# Patient Record
Sex: FEMALE | Race: WHITE | Employment: OTHER | URBAN - NONMETROPOLITAN AREA
[De-identification: names, ages, dates, MRNs, and addresses within clinical notes are randomized per-mention and may not be internally consistent; named-entity substitution may affect disease eponyms.]

---

## 2019-04-24 LAB
CHOLESTEROL, TOTAL: 157 MG/DL
CHOLESTEROL/HDL RATIO: 2.9
HDLC SERPL-MCNC: 54 MG/DL (ref 35–70)
LDL CHOLESTEROL CALCULATED: 82 MG/DL (ref 0–160)
TRIGL SERPL-MCNC: 114 MG/DL
VLDLC SERPL CALC-MCNC: 103 MG/DL

## 2019-06-17 ENCOUNTER — OFFICE VISIT (OUTPATIENT)
Dept: PODIATRY | Age: 69
End: 2019-06-17
Payer: MEDICARE

## 2019-06-17 ENCOUNTER — OFFICE VISIT (OUTPATIENT)
Dept: PRIMARY CARE CLINIC | Age: 69
End: 2019-06-17
Payer: MEDICARE

## 2019-06-17 VITALS — HEIGHT: 67 IN | BODY MASS INDEX: 26.37 KG/M2 | RESPIRATION RATE: 18 BRPM | WEIGHT: 168 LBS | OXYGEN SATURATION: 98 %

## 2019-06-17 VITALS
TEMPERATURE: 98.3 F | HEART RATE: 88 BPM | SYSTOLIC BLOOD PRESSURE: 128 MMHG | WEIGHT: 169 LBS | RESPIRATION RATE: 18 BRPM | OXYGEN SATURATION: 98 % | HEIGHT: 67 IN | BODY MASS INDEX: 26.53 KG/M2 | DIASTOLIC BLOOD PRESSURE: 84 MMHG

## 2019-06-17 DIAGNOSIS — M79.671 PAIN IN RIGHT FOOT: Primary | ICD-10-CM

## 2019-06-17 DIAGNOSIS — Z01.818 PREOPERATIVE CLEARANCE: ICD-10-CM

## 2019-06-17 DIAGNOSIS — M17.10 KNEE ARTHROPATHY: ICD-10-CM

## 2019-06-17 DIAGNOSIS — R07.89 OTHER CHEST PAIN: Primary | ICD-10-CM

## 2019-06-17 DIAGNOSIS — M20.41 HAMMER TOE OF SECOND TOE OF RIGHT FOOT: ICD-10-CM

## 2019-06-17 DIAGNOSIS — G45.9 TIA (TRANSIENT ISCHEMIC ATTACK): ICD-10-CM

## 2019-06-17 DIAGNOSIS — M21.611 BUNION, RIGHT: ICD-10-CM

## 2019-06-17 DIAGNOSIS — M77.41 METATARSALGIA, RIGHT FOOT: ICD-10-CM

## 2019-06-17 DIAGNOSIS — I10 ESSENTIAL HYPERTENSION: ICD-10-CM

## 2019-06-17 PROCEDURE — 93000 ELECTROCARDIOGRAM COMPLETE: CPT | Performed by: INTERNAL MEDICINE

## 2019-06-17 PROCEDURE — 99213 OFFICE O/P EST LOW 20 MIN: CPT | Performed by: PODIATRIST

## 2019-06-17 PROCEDURE — 99215 OFFICE O/P EST HI 40 MIN: CPT | Performed by: INTERNAL MEDICINE

## 2019-06-17 RX ORDER — ATORVASTATIN CALCIUM 10 MG/1
10 TABLET, FILM COATED ORAL DAILY
COMMUNITY
Start: 2018-08-22 | End: 2020-01-28 | Stop reason: CLARIF

## 2019-06-17 RX ORDER — AMLODIPINE BESYLATE 10 MG/1
10 TABLET ORAL DAILY
COMMUNITY
Start: 2018-08-22 | End: 2019-06-17

## 2019-06-17 RX ORDER — UBIDECARENONE 200 MG
100 CAPSULE ORAL DAILY
COMMUNITY

## 2019-06-17 RX ORDER — POTASSIUM CHLORIDE 750 MG/1
TABLET, EXTENDED RELEASE ORAL
COMMUNITY
Start: 2019-04-08 | End: 2019-09-10

## 2019-06-17 RX ORDER — AMLODIPINE BESYLATE 10 MG/1
10 TABLET ORAL DAILY
COMMUNITY
End: 2020-01-28 | Stop reason: ALTCHOICE

## 2019-06-17 RX ORDER — DULOXETIN HYDROCHLORIDE 60 MG/1
CAPSULE, DELAYED RELEASE ORAL
COMMUNITY
End: 2019-10-24 | Stop reason: SDUPTHER

## 2019-06-17 ASSESSMENT — ENCOUNTER SYMPTOMS
PHOTOPHOBIA: 0
EYE DISCHARGE: 0
TROUBLE SWALLOWING: 0
BLOOD IN STOOL: 0
RHINORRHEA: 0
SORE THROAT: 0
WHEEZING: 0
FACIAL SWELLING: 0
NAUSEA: 0
ANAL BLEEDING: 0
ABDOMINAL PAIN: 0
EYE ITCHING: 0
SHORTNESS OF BREATH: 0
EYE PAIN: 0
COUGH: 0
VOMITING: 0
CONSTIPATION: 0
COLOR CHANGE: 0
STRIDOR: 0
DIARRHEA: 0

## 2019-06-17 ASSESSMENT — PATIENT HEALTH QUESTIONNAIRE - PHQ9
SUM OF ALL RESPONSES TO PHQ QUESTIONS 1-9: 0
1. LITTLE INTEREST OR PLEASURE IN DOING THINGS: 0
SUM OF ALL RESPONSES TO PHQ9 QUESTIONS 1 & 2: 0
SUM OF ALL RESPONSES TO PHQ QUESTIONS 1-9: 0
2. FEELING DOWN, DEPRESSED OR HOPELESS: 0

## 2019-06-17 NOTE — PROGRESS NOTES
Pt here today to follow up for right foot pain. Pt was pre certified for orthotics unfortunately they are not covered. 19  Adeola Devi : 1950 Sex: female  Age: 76 y.o. Patient was referred by Shakila Moreno DO    CC:    Follow-up right bunion and right hammertoe second digit    HPI:   Follow-up right bunion right hammertoe second digit. Scheduled to have total knee replacement right knee in 2 weeks. States she has been wearing offloading padding between great toe and second toe on the right has noted significant improvement. States pain has improved as she has been wearing the padding most every day. Denies new injury or trauma. Denies any calf pain. No additional pedal complaints at this time. ROS:  Const: Denies constitutional symptoms  Musculo: Denies symptoms other than stated above  Skin: Denies symptoms other than stated above       Current Outpatient Medications:     atorvastatin (LIPITOR) 10 MG tablet, Take 10 mg by mouth daily , Disp: , Rfl:     DULoxetine (CYMBALTA) 60 MG extended release capsule, Take by mouth, Disp: , Rfl:     potassium chloride (KLOR-CON M) 10 MEQ extended release tablet, , Disp: , Rfl:     Multiple Vitamins-Minerals (PRESERVISION AREDS 2+MULTI VIT PO), Take by mouth, Disp: , Rfl:     Coenzyme Q10 200 MG CAPS, Take 100 mg by mouth daily , Disp: , Rfl:     Mirabegron ER (MYRBETRIQ) 50 MG TB24, Take by mouth, Disp: , Rfl:     COATED ASPIRIN PO, Take 1 tablet by mouth daily , Disp: , Rfl:     topiramate (TOPAMAX) 50 MG tablet, Take 50 mg by mouth nightly.  , Disp: , Rfl:     hydrochlorothiazide (HYDRODIURIL) 25 MG tablet, Take 1 tablet by mouth daily , Disp: , Rfl:     vitamin D (ERGOCALCIFEROL) 45078 UNITS CAPS capsule, Take 50,000 Units by mouth once a week.  EVERY TWO (2) WEEKS , Disp: , Rfl:     amLODIPine (NORVASC) 10 MG tablet, Take 10 mg by mouth daily, Disp: , Rfl:   No Known Allergies    Past Medical History:   Diagnosis Date    Chronic back pain     Depression     Headache(784.0)     Hyperlipidemia     Hypertension     Osteoarthritis     TIA (transient ischemic attack)            Vitals:    06/17/19 1001   Resp: 18   SpO2: 98%   Weight: 168 lb (76.2 kg)   Height: 5' 7\" (1.702 m)       Work History/Social History: Dr. Nina Salinas right [facility] July 2019    Focused Lower Extremity Physical Exam:    Neurovascular examination:    Dorsalis Pedis palpable bilateral.  Posterior tibialis palpable bilateral.    Capillary Refill Time:  Immediate return  Hair growth:  Symmetrical and bilateral   Skin:  Not atrophic  Edema: No edema bilateral feet or ankles. Neurologic:  Light touch intact bilateral.      Musculoskeletal/ Orthopedic examination:    Equinis: Absent bilateral  Dorsiflexion, plantarflexion, inversion, eversion bilateral 5 out of 5 muscle strength  Wiggling toes  Negative Homans  Mild bunion deformity right great toe. Crossover hammertoe digit second digit. Positive Lachman second metatarsophalangeal joint. Mild tenderness medial eminence right great toe. Dermatology examination:    No open skin lesions or abrasions bilateral lower extremity. Assessment and Plan:  Steffanie Bojorquez was seen today for foot pain. Diagnoses and all orders for this visit:    Pain in right foot    Bunion, right    Metatarsalgia, right foot    Hammer toe of second toe of right foot    Follow-up bunion and hammertoe second digit right great toe right foot. I did dispense new offloading padding between great toe and second toe. Patient progressing well. We did check for orthotics these are not covered at this time. Continue all supportive walking shoe. Patient is having total knee replacement by Dr. Nina Salinas in July 2018. I will follow-up in 3 months. Continue with conservative treatment from foot and ankle standpoint at this time. Return in about 3 months (around 9/17/2019).       Seen By:  Rosy Barnett DPM      Document was created using voice recognition software. Note was reviewed, however may contain grammatical errors.

## 2019-06-17 NOTE — ASSESSMENT & PLAN NOTE
ekg showed nonspecific T wave flattening, no acute changes.   Order stress test at [facility]

## 2019-06-17 NOTE — ASSESSMENT & PLAN NOTE
Blood pressures are stable. Continue medications and monitor blood pressures at home. Call office if systolics are over 628 over diastolics over 90.

## 2019-06-17 NOTE — PROGRESS NOTES
2019    Name: El Das : 1950 Sex: female  Age: 76 y.o. Subjective:  Chief Complaint   Patient presents with    Pre-op Exam     Knee replacement scheduled for 7/10/19. This 76y.o. -year-old female  presents today for evaluation and management of her  chronic medical problems. Current medication list reviewed. The patient is tolerating all medications well without adverse events or known side effects. The patient does understand the risk and benefits of the prescribed medications. Patient is here for preoperative clearance. She is scheduled for a right total knee arthroplasty with Dr. Hugo Lara on July 10, 2019. She has had problems with pain in her right knee. Her knee angel when she walks. She has had injections done by Dr. Null Later. She is tried physical therapy, anti-inflammatories and she is to the point where it is difficult for her to walk because of the pain. X-ray showed \"bone-on-bone\" arthritis of the right knee. She has a history of remote TIA on aspirin, hypertension on amlodipine, urge incontinence Myrbetriq per urologist, depression on duloxetine, hyperlipidemia on atorvastatin. She complains  of pressure under her left breast.  Radiates up to her jaw. Sometimes gets short of breath with this. It is lasts about 2 or 3 minutes. Sometimes she takes an antacid which helps. No diaphoresis, lightheadedness. No previous history of cardiac problems. Years ago she had a treadmill stress test which was negative for ischemia. Will have to order a nuclear stress testt she is  unable to walk on the treadmill because of her knee arthritis    Review of Systems   Constitutional: Negative for appetite change, fatigue and unexpected weight change. HENT: Negative for congestion, ear pain, facial swelling, rhinorrhea, sore throat, tinnitus and trouble swallowing. Eyes: Negative for photophobia, pain, discharge, itching and visual disturbance.    Respiratory: Negative for cough, shortness of breath, wheezing and stridor. Cardiovascular: Positive for chest pain. Negative for palpitations and leg swelling. See hpi   Gastrointestinal: Negative for abdominal pain, anal bleeding, blood in stool, constipation, diarrhea, nausea and vomiting. Endocrine: Negative for cold intolerance, heat intolerance, polydipsia, polyphagia and polyuria. Genitourinary: Negative for difficulty urinating, dysuria, flank pain, frequency, hematuria and urgency. Musculoskeletal: Positive for arthralgias. Negative for gait problem, joint swelling and myalgias. Right knee pain, see hpi   Skin: Negative for color change, pallor and rash. Allergic/Immunologic: Negative for environmental allergies and food allergies. Neurological: Negative for dizziness, tremors, seizures, syncope, speech difficulty, weakness, light-headedness, numbness and headaches. Hematological: Negative for adenopathy. Does not bruise/bleed easily. Psychiatric/Behavioral: Negative for agitation, behavioral problems, confusion, sleep disturbance and suicidal ideas. The patient is not nervous/anxious.            Current Outpatient Medications:     atorvastatin (LIPITOR) 10 MG tablet, Take 10 mg by mouth daily , Disp: , Rfl:     DULoxetine (CYMBALTA) 60 MG extended release capsule, Take by mouth, Disp: , Rfl:     potassium chloride (KLOR-CON M) 10 MEQ extended release tablet, , Disp: , Rfl:     Multiple Vitamins-Minerals (PRESERVISION AREDS 2+MULTI VIT PO), Take by mouth, Disp: , Rfl:     Coenzyme Q10 200 MG CAPS, Take 100 mg by mouth daily , Disp: , Rfl:     Mirabegron ER (MYRBETRIQ) 50 MG TB24, Take by mouth, Disp: , Rfl:     amLODIPine (NORVASC) 10 MG tablet, Take 10 mg by mouth daily, Disp: , Rfl:     COATED ASPIRIN PO, Take 1 tablet by mouth daily , Disp: , Rfl:     topiramate (TOPAMAX) 50 MG tablet, Take 50 mg by mouth nightly.  , Disp: , Rfl:     hydrochlorothiazide (HYDRODIURIL) 25 MG tablet, Take 1 tablet by mouth daily , Disp: , Rfl:     vitamin D (ERGOCALCIFEROL) 00643 UNITS CAPS capsule, Take 50,000 Units by mouth once a week. EVERY TWO (2) WEEKS , Disp: , Rfl:      No Known Allergies     Past Medical History:   Diagnosis Date    Chronic back pain     Depression     Headache(784.0)     Hyperlipidemia     Hypertension     Osteoarthritis     TIA (transient ischemic attack)        Health Maintenance Due   Topic Date Due    Potassium monitoring  1950    Creatinine monitoring  1950    Hepatitis C screen  1950    Lipid screen  06/28/1990    Diabetes screen  06/28/1990    Breast cancer screen  06/28/2000    Colon cancer screen colonoscopy  06/28/2000    DEXA (modify frequency per FRAX score)  06/28/2015    Shingles Vaccine (2 of 2) 07/05/2018    Pneumococcal 65+ years Vaccine (2 of 2 - PPSV23) 06/13/2019        Patient Active Problem List   Diagnosis    Cervical spondylosis without myelopathy    TIA (transient ischemic attack)    Essential hypertension    Preoperative clearance    Knee arthropathy    Other chest pain        Past Surgical History:   Procedure Laterality Date    HYSTERECTOMY      JOINT REPLACEMENT  2007    Knee surgery    SPINE SURGERY  2001    neck surgery with Dr Jean Campos        Family History   Problem Relation Age of Onset    Diabetes Mother         Social History     Tobacco Use    Smoking status: Never Smoker    Smokeless tobacco: Never Used   Substance Use Topics    Alcohol use: No    Drug use: No        Objective  Vitals:    06/17/19 1043   BP: 128/84   Site: Left Upper Arm   Position: Sitting   Cuff Size: Medium Adult   Pulse: 88   Resp: 18   Temp: 98.3 °F (36.8 °C)   TempSrc: Temporal   SpO2: 98%   Weight: 169 lb (76.7 kg)   Height: 5' 7\" (1.702 m)        Exam:  Physical Exam   Constitutional: She is oriented to person, place, and time. She appears well-developed and well-nourished. HENT:   Head: Normocephalic.    Right Ear: External ear normal.   Left Ear: External ear normal.   Nose: Nose normal.   Mouth/Throat: Oropharynx is clear and moist. No oropharyngeal exudate. Eyes: Pupils are equal, round, and reactive to light. Conjunctivae and EOM are normal. Right eye exhibits no discharge. Left eye exhibits no discharge. No scleral icterus. Neck: Normal range of motion. Neck supple. No thyromegaly present. Cardiovascular: Normal rate, regular rhythm, normal heart sounds and intact distal pulses. Exam reveals no gallop and no friction rub. No murmur heard. Pulmonary/Chest: Effort normal and breath sounds normal. No respiratory distress. She has no wheezes. She has no rales. She exhibits no tenderness. Abdominal: Soft. Bowel sounds are normal. She exhibits no distension and no mass. There is no tenderness. There is no rebound and no guarding. Musculoskeletal: She exhibits no edema, tenderness or deformity. Tenderness elicited on palpation of her right knee. Decreased range of motion to flexion and extension of her right knee   Lymphadenopathy:     She has no cervical adenopathy. Neurological: She is alert and oriented to person, place, and time. She displays normal reflexes. No cranial nerve deficit or sensory deficit. Skin: Skin is warm and dry. No rash noted. No erythema. No pallor. Psychiatric: She has a normal mood and affect. Her behavior is normal. Judgment and thought content normal.        Last labs reviewed. ASSESSMENT & PLAN :   Problem List        Circulatory    TIA (transient ischemic attack)     Continue aspirin but discontinue about 10 days prior to surgery         Relevant Medications    COATED ASPIRIN PO    hydrochlorothiazide (HYDRODIURIL) 25 MG tablet    atorvastatin (LIPITOR) 10 MG tablet    amLODIPine (NORVASC) 10 MG tablet    Essential hypertension     Blood pressures are stable. Continue medications and monitor blood pressures at home.  Call office if systolics are over 520 over diastolics

## 2019-06-17 NOTE — PATIENT INSTRUCTIONS
Nuclear medicine stress test at [address] [place] [address]  See me one week after stress test

## 2019-07-02 DIAGNOSIS — R07.89 OTHER CHEST PAIN: ICD-10-CM

## 2019-07-17 PROBLEM — Z01.818 PREOPERATIVE CLEARANCE: Status: RESOLVED | Noted: 2019-06-17 | Resolved: 2019-07-17

## 2019-07-24 ENCOUNTER — OFFICE VISIT (OUTPATIENT)
Dept: PRIMARY CARE CLINIC | Age: 69
End: 2019-07-24
Payer: MEDICARE

## 2019-07-24 VITALS
WEIGHT: 172.2 LBS | HEART RATE: 108 BPM | SYSTOLIC BLOOD PRESSURE: 118 MMHG | RESPIRATION RATE: 18 BRPM | BODY MASS INDEX: 27.03 KG/M2 | OXYGEN SATURATION: 96 % | TEMPERATURE: 98.1 F | DIASTOLIC BLOOD PRESSURE: 74 MMHG | HEIGHT: 67 IN

## 2019-07-24 DIAGNOSIS — K59.03 DRUG-INDUCED CONSTIPATION: ICD-10-CM

## 2019-07-24 DIAGNOSIS — I10 ESSENTIAL HYPERTENSION: Primary | ICD-10-CM

## 2019-07-24 DIAGNOSIS — F32.A DEPRESSION, UNSPECIFIED DEPRESSION TYPE: ICD-10-CM

## 2019-07-24 DIAGNOSIS — E78.2 MIXED HYPERLIPIDEMIA: ICD-10-CM

## 2019-07-24 PROBLEM — E78.5 HYPERLIPIDEMIA: Status: ACTIVE | Noted: 2019-07-24

## 2019-07-24 PROCEDURE — 99213 OFFICE O/P EST LOW 20 MIN: CPT | Performed by: INTERNAL MEDICINE

## 2019-07-24 RX ORDER — OXYBUTYNIN CHLORIDE 15 MG/1
1 TABLET, EXTENDED RELEASE ORAL DAILY PRN
Refills: 0 | COMMUNITY
Start: 2019-07-02 | End: 2019-09-10

## 2019-07-24 RX ORDER — OXYBUTYNIN CHLORIDE 15 MG/1
15 TABLET, EXTENDED RELEASE ORAL DAILY
COMMUNITY
End: 2019-09-10 | Stop reason: ALTCHOICE

## 2019-07-24 RX ORDER — DOCUSATE SODIUM 100 MG/1
1 CAPSULE, LIQUID FILLED ORAL DAILY PRN
Refills: 0 | COMMUNITY
Start: 2019-07-12

## 2019-07-24 ASSESSMENT — ENCOUNTER SYMPTOMS
ABDOMINAL PAIN: 0
CONSTIPATION: 1
PHOTOPHOBIA: 0
ANAL BLEEDING: 0
WHEEZING: 0
STRIDOR: 0
VOMITING: 0
FACIAL SWELLING: 0
EYE ITCHING: 0
BLOOD IN STOOL: 0
SHORTNESS OF BREATH: 0
SORE THROAT: 0
EYE DISCHARGE: 0
COUGH: 0
RHINORRHEA: 0
EYE PAIN: 0
COLOR CHANGE: 0
TROUBLE SWALLOWING: 0
NAUSEA: 0
DIARRHEA: 0

## 2019-07-24 NOTE — PROGRESS NOTES
tablet    atorvastatin (LIPITOR) 10 MG tablet    amLODIPine (NORVASC) 10 MG tablet           Return in about 3 months (around 10/24/2019).        Willem Robles,   7/24/2019

## 2019-09-05 ENCOUNTER — OFFICE VISIT (OUTPATIENT)
Dept: PRIMARY CARE CLINIC | Age: 69
End: 2019-09-05
Payer: MEDICARE

## 2019-09-05 ENCOUNTER — HOSPITAL ENCOUNTER (OUTPATIENT)
Age: 69
Discharge: HOME | End: 2019-09-07
Payer: MEDICARE

## 2019-09-05 VITALS
RESPIRATION RATE: 16 BRPM | BODY MASS INDEX: 24.96 KG/M2 | HEART RATE: 92 BPM | DIASTOLIC BLOOD PRESSURE: 80 MMHG | TEMPERATURE: 98.4 F | SYSTOLIC BLOOD PRESSURE: 120 MMHG | OXYGEN SATURATION: 95 % | WEIGHT: 159 LBS | HEIGHT: 67 IN

## 2019-09-05 DIAGNOSIS — I10 ESSENTIAL HYPERTENSION: ICD-10-CM

## 2019-09-05 DIAGNOSIS — E87.6 HYPOKALEMIA: ICD-10-CM

## 2019-09-05 DIAGNOSIS — R63.4 WEIGHT LOSS: ICD-10-CM

## 2019-09-05 DIAGNOSIS — R35.0 FREQUENCY OF URINATION: ICD-10-CM

## 2019-09-05 DIAGNOSIS — F32.A DEPRESSION, UNSPECIFIED DEPRESSION TYPE: ICD-10-CM

## 2019-09-05 DIAGNOSIS — R63.4 WEIGHT LOSS: Primary | ICD-10-CM

## 2019-09-05 LAB
ALBUMIN SERPL-MCNC: 4.7 G/DL (ref 3.5–5.2)
ALP BLD-CCNC: 96 U/L (ref 35–104)
ALT SERPL-CCNC: 23 U/L (ref 0–32)
ANION GAP SERPL CALCULATED.3IONS-SCNC: 19 MMOL/L (ref 7–16)
AST SERPL-CCNC: 29 U/L (ref 0–31)
BASOPHILS ABSOLUTE: 0.1 E9/L (ref 0–0.2)
BASOPHILS RELATIVE PERCENT: 1 % (ref 0–2)
BILIRUB SERPL-MCNC: 0.5 MG/DL (ref 0–1.2)
BILIRUBIN, POC: NEGATIVE
BLOOD URINE, POC: NEGATIVE
BUN BLDV-MCNC: 18 MG/DL (ref 8–23)
CALCIUM SERPL-MCNC: 10.4 MG/DL (ref 8.6–10.2)
CHLORIDE BLD-SCNC: 102 MMOL/L (ref 98–107)
CHP ED QC CHECK: NORMAL
CLARITY, POC: CLEAR
CO2: 25 MMOL/L (ref 22–29)
COLOR, POC: YELLOW
CREAT SERPL-MCNC: 0.6 MG/DL (ref 0.5–1)
EOSINOPHILS ABSOLUTE: 0.53 E9/L (ref 0.05–0.5)
EOSINOPHILS RELATIVE PERCENT: 5.4 % (ref 0–6)
GFR AFRICAN AMERICAN: >60
GFR NON-AFRICAN AMERICAN: >60 ML/MIN/1.73
GLUCOSE BLD-MCNC: 103 MG/DL (ref 74–99)
GLUCOSE BLD-MCNC: 111 MG/DL
GLUCOSE URINE, POC: NEGATIVE
HCT VFR BLD CALC: 46.1 % (ref 34–48)
HEMOGLOBIN: 14.6 G/DL (ref 11.5–15.5)
IMMATURE GRANULOCYTES #: 0.02 E9/L
IMMATURE GRANULOCYTES %: 0.2 % (ref 0–5)
KETONES, POC: NEGATIVE
LEUKOCYTE EST, POC: NORMAL
LYMPHOCYTES ABSOLUTE: 2.74 E9/L (ref 1.5–4)
LYMPHOCYTES RELATIVE PERCENT: 28.2 % (ref 20–42)
MCH RBC QN AUTO: 29.6 PG (ref 26–35)
MCHC RBC AUTO-ENTMCNC: 31.7 % (ref 32–34.5)
MCV RBC AUTO: 93.3 FL (ref 80–99.9)
MONOCYTES ABSOLUTE: 1.01 E9/L (ref 0.1–0.95)
MONOCYTES RELATIVE PERCENT: 10.4 % (ref 2–12)
NEUTROPHILS ABSOLUTE: 5.33 E9/L (ref 1.8–7.3)
NEUTROPHILS RELATIVE PERCENT: 54.8 % (ref 43–80)
NITRITE, POC: NEGATIVE
PDW BLD-RTO: 13.5 FL (ref 11.5–15)
PH, POC: 8.5
PLATELET # BLD: 435 E9/L (ref 130–450)
PMV BLD AUTO: 10.7 FL (ref 7–12)
POTASSIUM SERPL-SCNC: 3 MMOL/L (ref 3.5–5)
PROTEIN, POC: NORMAL
RBC # BLD: 4.94 E12/L (ref 3.5–5.5)
SEDIMENTATION RATE, ERYTHROCYTE: 12 MM/HR (ref 0–20)
SODIUM BLD-SCNC: 146 MMOL/L (ref 132–146)
SPECIFIC GRAVITY, POC: 1.01
T4 FREE: 1.1 NG/DL (ref 0.93–1.7)
TOTAL PROTEIN: 7.7 G/DL (ref 6.4–8.3)
TSH SERPL DL<=0.05 MIU/L-ACNC: 1.81 UIU/ML (ref 0.27–4.2)
UROBILINOGEN, POC: 0.2
WBC # BLD: 9.7 E9/L (ref 4.5–11.5)

## 2019-09-05 PROCEDURE — 82962 GLUCOSE BLOOD TEST: CPT | Performed by: INTERNAL MEDICINE

## 2019-09-05 PROCEDURE — 85025 COMPLETE CBC W/AUTO DIFF WBC: CPT

## 2019-09-05 PROCEDURE — 36415 COLL VENOUS BLD VENIPUNCTURE: CPT

## 2019-09-05 PROCEDURE — 85651 RBC SED RATE NONAUTOMATED: CPT

## 2019-09-05 PROCEDURE — 84439 ASSAY OF FREE THYROXINE: CPT

## 2019-09-05 PROCEDURE — 80053 COMPREHEN METABOLIC PANEL: CPT

## 2019-09-05 PROCEDURE — 99214 OFFICE O/P EST MOD 30 MIN: CPT | Performed by: INTERNAL MEDICINE

## 2019-09-05 PROCEDURE — 84443 ASSAY THYROID STIM HORMONE: CPT

## 2019-09-05 PROCEDURE — 81002 URINALYSIS NONAUTO W/O SCOPE: CPT | Performed by: INTERNAL MEDICINE

## 2019-09-05 ASSESSMENT — ENCOUNTER SYMPTOMS
EYE ITCHING: 0
BLOOD IN STOOL: 0
FACIAL SWELLING: 0
NAUSEA: 0
TROUBLE SWALLOWING: 0
COLOR CHANGE: 0
PHOTOPHOBIA: 0
ANAL BLEEDING: 0
SHORTNESS OF BREATH: 0
VOMITING: 0
WHEEZING: 0
STRIDOR: 0
COUGH: 0
RHINORRHEA: 0
SORE THROAT: 0
CONSTIPATION: 0
EYE DISCHARGE: 0
ABDOMINAL PAIN: 0
EYE PAIN: 0
DIARRHEA: 0

## 2019-09-05 NOTE — ASSESSMENT & PLAN NOTE
Check a CBC, CMP, sed rate, TSH and free T4. Paxton Dawkins Fasting blood sugar by fingerstick was 111.

## 2019-09-05 NOTE — PROGRESS NOTES
visual disturbance. Respiratory: Negative for cough, shortness of breath, wheezing and stridor. Cardiovascular: Negative for chest pain, palpitations and leg swelling. Gastrointestinal: Negative for abdominal pain, anal bleeding, blood in stool, constipation, diarrhea, nausea and vomiting. Endocrine: Negative for cold intolerance, heat intolerance, polydipsia, polyphagia and polyuria. Genitourinary: Positive for frequency. Negative for difficulty urinating, dysuria, flank pain, hematuria and urgency. Musculoskeletal: Negative for arthralgias, gait problem, joint swelling and myalgias. Skin: Negative for color change, pallor and rash. Allergic/Immunologic: Negative for environmental allergies and food allergies. Neurological: Negative for dizziness, tremors, seizures, syncope, speech difficulty, weakness, light-headedness, numbness and headaches. Hematological: Negative for adenopathy. Does not bruise/bleed easily. Psychiatric/Behavioral: Negative for agitation, behavioral problems, confusion, sleep disturbance and suicidal ideas. The patient is not nervous/anxious.            Current Outpatient Medications:     RA COL-RITE 100 MG capsule, Take 1 capsule by mouth daily as needed, Disp: , Rfl: 0    MELOXICAM PO, Take 1 tablet by mouth daily as needed, Disp: , Rfl:     oxybutynin (DITROPAN XL) 15 MG extended release tablet, Take 15 mg by mouth daily, Disp: , Rfl:     atorvastatin (LIPITOR) 10 MG tablet, Take 10 mg by mouth daily , Disp: , Rfl:     DULoxetine (CYMBALTA) 60 MG extended release capsule, Take by mouth, Disp: , Rfl:     potassium chloride (KLOR-CON M) 10 MEQ extended release tablet, , Disp: , Rfl:     Multiple Vitamins-Minerals (PRESERVISION AREDS 2+MULTI VIT PO), Take by mouth, Disp: , Rfl:     Coenzyme Q10 200 MG CAPS, Take 100 mg by mouth daily , Disp: , Rfl:     amLODIPine (NORVASC) 10 MG tablet, Take 10 mg by mouth daily, Disp: , Rfl:     COATED ASPIRIN PO, Take 1 tablet by mouth 2 times daily , Disp: , Rfl:     topiramate (TOPAMAX) 50 MG tablet, Take 50 mg by mouth nightly.  , Disp: , Rfl:     hydrochlorothiazide (HYDRODIURIL) 25 MG tablet, Take 1 tablet by mouth daily , Disp: , Rfl:     vitamin D (ERGOCALCIFEROL) 54437 UNITS CAPS capsule, Take 50,000 Units by mouth once a week.  EVERY TWO (2) WEEKS , Disp: , Rfl:     oxybutynin (DITROPAN XL) 15 MG extended release tablet, Take 1 tablet by mouth daily as needed, Disp: , Rfl: 0     No Known Allergies     Past Medical History:   Diagnosis Date    Chronic back pain     Depression     Headache(784.0)     Hyperlipidemia     Hypertension     Osteoarthritis     TIA (transient ischemic attack)        Health Maintenance Due   Topic Date Due    Hepatitis C screen  1950    Lipid screen  06/28/1990    Annual Wellness Visit (AWV)  06/28/2013    DEXA (modify frequency per FRAX score)  06/28/2015    Pneumococcal 65+ years Vaccine (2 of 2 - PPSV23) 06/13/2019    Flu vaccine (1) 09/01/2019        Patient Active Problem List   Diagnosis    Cervical spondylosis without myelopathy    TIA (transient ischemic attack)    Essential hypertension    Knee arthropathy    Other chest pain    Depression    Hyperlipidemia    Drug-induced constipation    Weight loss    Frequency of urination    Hypokalemia        Past Surgical History:   Procedure Laterality Date    HYSTERECTOMY      JOINT REPLACEMENT  2007    Knee surgery    SPINE SURGERY  2001    neck surgery with Dr Bernie Alvarez        Family History   Problem Relation Age of Onset    Diabetes Mother         Social History     Tobacco Use    Smoking status: Never Smoker    Smokeless tobacco: Never Used   Substance Use Topics    Alcohol use: No    Drug use: No        Objective  Vitals:    09/05/19 0818   BP: 120/80   Site: Right Upper Arm   Position: Sitting   Cuff Size: Medium Adult   Pulse: 92   Resp: 16   Temp: 98.4 °F (36.9 °C)   TempSrc: Temporal   SpO2: 95%   Weight: 159 lb (72.1 kg)   Height: 5' 7\" (1.702 m)        Exam:  Physical Exam   Constitutional: She is oriented to person, place, and time. She appears well-developed and well-nourished. HENT:   Head: Normocephalic. Right Ear: External ear normal.   Left Ear: External ear normal.   Nose: Nose normal.   Mouth/Throat: Oropharynx is clear and moist. No oropharyngeal exudate. Eyes: Pupils are equal, round, and reactive to light. Conjunctivae and EOM are normal. Right eye exhibits no discharge. Left eye exhibits no discharge. No scleral icterus. Neck: Normal range of motion. Neck supple. No thyromegaly present. Cardiovascular: Normal rate, regular rhythm, normal heart sounds and intact distal pulses. Exam reveals no gallop and no friction rub. No murmur heard. Pulmonary/Chest: Effort normal and breath sounds normal. No respiratory distress. She has no wheezes. She has no rales. She exhibits no tenderness. Abdominal: Soft. Bowel sounds are normal. She exhibits no distension and no mass. There is no tenderness. There is no rebound and no guarding. Musculoskeletal: Normal range of motion. She exhibits no edema, tenderness or deformity. Incision well-healed on her right patella. There is no erythema or increased warmth around the incision site. Lymphadenopathy:     She has no cervical adenopathy. Neurological: She is alert and oriented to person, place, and time. She displays normal reflexes. No cranial nerve deficit or sensory deficit. Skin: Skin is warm and dry. No rash noted. No erythema. No pallor. Psychiatric: She has a normal mood and affect. Her behavior is normal. Judgment and thought content normal.        Last labs reviewed. ASSESSMENT & PLAN :   Problem List        Circulatory    Essential hypertension     Blood pressures are stable. Continue medications and monitor blood pressures at home. Call office if systolics are over 291 over diastolics over 90.          Relevant Medications    COATED ASPIRIN PO    hydrochlorothiazide (HYDRODIURIL) 25 MG tablet    atorvastatin (LIPITOR) 10 MG tablet    amLODIPine (NORVASC) 10 MG tablet       Other    Depression     Continue duloxetine         Relevant Medications    DULoxetine (CYMBALTA) 60 MG extended release capsule    Weight loss - Primary     Check a CBC, CMP, sed rate, TSH and free T4. Roberto Carlos Cote Fasting blood sugar by fingerstick was 111. Relevant Orders    Comprehensive Metabolic Panel    CBC Auto Differential    Sedimentation Rate    POCT Glucose (Completed)    T4, Free    TSH without Reflex    Frequency of urination     Urinalysis did not show any evidence of glucosuria or ketonuria. No sign of infection. Relevant Orders    POCT Urinalysis no Micro (Completed)    POCT Glucose (Completed)    Hypokalemia     Check serum potassium         Relevant Orders    Comprehensive Metabolic Panel           Return in about 5 days (around 9/10/2019).   If she gets worse in the meantime she is to go to the emergency department      Shira López,   9/5/2019

## 2019-09-05 NOTE — ASSESSMENT & PLAN NOTE
Blood pressures are stable. Continue medications and monitor blood pressures at home. Call office if systolics are over 098 over diastolics over 90.

## 2019-09-10 ENCOUNTER — OFFICE VISIT (OUTPATIENT)
Dept: PRIMARY CARE CLINIC | Age: 69
End: 2019-09-10
Payer: MEDICARE

## 2019-09-10 ENCOUNTER — HOSPITAL ENCOUNTER (OUTPATIENT)
Age: 69
Discharge: HOME | End: 2019-09-12
Payer: MEDICARE

## 2019-09-10 VITALS
OXYGEN SATURATION: 97 % | RESPIRATION RATE: 16 BRPM | TEMPERATURE: 98.5 F | HEART RATE: 85 BPM | DIASTOLIC BLOOD PRESSURE: 72 MMHG | WEIGHT: 161.4 LBS | SYSTOLIC BLOOD PRESSURE: 108 MMHG | BODY MASS INDEX: 25.28 KG/M2

## 2019-09-10 DIAGNOSIS — E87.6 HYPOKALEMIA: Primary | ICD-10-CM

## 2019-09-10 DIAGNOSIS — I10 ESSENTIAL HYPERTENSION: ICD-10-CM

## 2019-09-10 DIAGNOSIS — E78.2 MIXED HYPERLIPIDEMIA: ICD-10-CM

## 2019-09-10 DIAGNOSIS — Z23 NEED FOR INFLUENZA VACCINATION: ICD-10-CM

## 2019-09-10 DIAGNOSIS — R35.0 FREQUENCY OF URINATION: ICD-10-CM

## 2019-09-10 DIAGNOSIS — Z23 NEED FOR TETANUS BOOSTER: ICD-10-CM

## 2019-09-10 DIAGNOSIS — M17.10 KNEE ARTHROPATHY: ICD-10-CM

## 2019-09-10 DIAGNOSIS — F32.A DEPRESSION, UNSPECIFIED DEPRESSION TYPE: ICD-10-CM

## 2019-09-10 LAB
CHOLESTEROL, TOTAL: 161 MG/DL (ref 0–199)
HDLC SERPL-MCNC: 49 MG/DL
LDL CHOLESTEROL CALCULATED: 87 MG/DL (ref 0–99)
TRIGL SERPL-MCNC: 125 MG/DL (ref 0–149)
VLDLC SERPL CALC-MCNC: 25 MG/DL

## 2019-09-10 PROCEDURE — 90653 IIV ADJUVANT VACCINE IM: CPT | Performed by: INTERNAL MEDICINE

## 2019-09-10 PROCEDURE — 36415 COLL VENOUS BLD VENIPUNCTURE: CPT

## 2019-09-10 PROCEDURE — 80061 LIPID PANEL: CPT

## 2019-09-10 PROCEDURE — 99214 OFFICE O/P EST MOD 30 MIN: CPT | Performed by: INTERNAL MEDICINE

## 2019-09-10 PROCEDURE — G0008 ADMIN INFLUENZA VIRUS VAC: HCPCS | Performed by: INTERNAL MEDICINE

## 2019-09-10 RX ORDER — POTASSIUM CHLORIDE 20 MEQ/1
20 TABLET, EXTENDED RELEASE ORAL DAILY
Qty: 30 TABLET | Refills: 5 | Status: SHIPPED | OUTPATIENT
Start: 2019-09-10 | End: 2020-01-07 | Stop reason: SDUPTHER

## 2019-09-10 ASSESSMENT — ENCOUNTER SYMPTOMS
COUGH: 0
TROUBLE SWALLOWING: 0
SORE THROAT: 0
BLOOD IN STOOL: 0
EYE PAIN: 0
DIARRHEA: 0
EYE ITCHING: 0
WHEEZING: 0
VOMITING: 0
NAUSEA: 0
RHINORRHEA: 0
ABDOMINAL PAIN: 0
FACIAL SWELLING: 0
EYE DISCHARGE: 0
CONSTIPATION: 0
COLOR CHANGE: 0
SHORTNESS OF BREATH: 0
PHOTOPHOBIA: 0
ANAL BLEEDING: 0
STRIDOR: 0

## 2019-09-10 NOTE — PROGRESS NOTES
9/10/2019    Name: Eliza Rivero : 1950 Sex: female  Age: 71 y.o. Subjective:  Chief Complaint   Patient presents with    Fever     5 day recheck of fever. Symptoms are improved. HPI     Patient was evaluated last week for generalized malaise, low temperature, see previous encounter. Blood work was unremarkable. Except for potassium which was low at 3. This included a CBC, CMP and a sed rate. That day she saw her urologist on a regular scheduled appointment and he did a UA on her. He felt that she had a UTI and treated her for this. She feels much better. She also talked with her urologist regarding placement of an InterStim. She is going to think about this. She has an appointment to see Dr. Tatianna Torres on Monday because of pain in her right foot. She has seen him in the past.    She has a history of hypertension, hyperlipidemia, knee arthritis, urinary frequency, and depression. She is on Cymbalta 60 mg a day and does well on this. She has no symptoms of anhedonia, hypersomnolence or suicidal or homicidal ideations. Review of Systems   Constitutional: Negative for appetite change, fatigue and unexpected weight change. HENT: Negative for congestion, ear pain, facial swelling, rhinorrhea, sore throat, tinnitus and trouble swallowing. Eyes: Negative for photophobia, pain, discharge, itching and visual disturbance. Respiratory: Negative for cough, shortness of breath, wheezing and stridor. Cardiovascular: Negative for chest pain, palpitations and leg swelling. Gastrointestinal: Negative for abdominal pain, anal bleeding, blood in stool, constipation, diarrhea, nausea and vomiting. Endocrine: Negative for cold intolerance, heat intolerance, polydipsia, polyphagia and polyuria. Genitourinary: Positive for frequency and urgency. Negative for difficulty urinating, dysuria, flank pain and hematuria. Musculoskeletal: Positive for arthralgias.  Negative for gait problem, joint swelling and myalgias. Right great toe   Skin: Negative for color change, pallor and rash. Allergic/Immunologic: Negative for environmental allergies and food allergies. Neurological: Negative for dizziness, tremors, seizures, syncope, speech difficulty, weakness, light-headedness, numbness and headaches. Hematological: Negative for adenopathy. Does not bruise/bleed easily. Psychiatric/Behavioral: Negative for agitation, behavioral problems, confusion, sleep disturbance and suicidal ideas. The patient is not nervous/anxious. Current Outpatient Medications:     potassium chloride (KLOR-CON M) 20 MEQ extended release tablet, Take 1 tablet by mouth daily, Disp: 30 tablet, Rfl: 5    Tetanus-Diphth-Acell Pertussis (BOOSTRIX) 5-2.5-18.5 LF-MCG/0.5 injection, Inject 0.5 mLs into the muscle once for 1 dose, Disp: 1 each, Rfl: 0    RA COL-RITE 100 MG capsule, Take 1 capsule by mouth daily as needed, Disp: , Rfl: 0    MELOXICAM PO, Take 1 tablet by mouth daily as needed, Disp: , Rfl:     atorvastatin (LIPITOR) 10 MG tablet, Take 10 mg by mouth daily , Disp: , Rfl:     DULoxetine (CYMBALTA) 60 MG extended release capsule, Take by mouth, Disp: , Rfl:     Multiple Vitamins-Minerals (PRESERVISION AREDS 2+MULTI VIT PO), Take by mouth, Disp: , Rfl:     Coenzyme Q10 200 MG CAPS, Take 100 mg by mouth daily , Disp: , Rfl:     amLODIPine (NORVASC) 10 MG tablet, Take 10 mg by mouth daily, Disp: , Rfl:     COATED ASPIRIN PO, Take 1 tablet by mouth 2 times daily , Disp: , Rfl:     topiramate (TOPAMAX) 50 MG tablet, Take 50 mg by mouth nightly.  , Disp: , Rfl:     hydrochlorothiazide (HYDRODIURIL) 25 MG tablet, Take 1 tablet by mouth daily , Disp: , Rfl:     vitamin D (ERGOCALCIFEROL) 42871 UNITS CAPS capsule, Take 50,000 Units by mouth once a week.  EVERY TWO (2) WEEKS , Disp: , Rfl:      No Known Allergies     Past Medical History:   Diagnosis Date    Chronic back pain     Depression    

## 2019-09-11 ENCOUNTER — TELEPHONE (OUTPATIENT)
Dept: FAMILY MEDICINE CLINIC | Age: 69
End: 2019-09-11

## 2019-09-16 ENCOUNTER — OFFICE VISIT (OUTPATIENT)
Dept: PODIATRY | Age: 69
End: 2019-09-16
Payer: MEDICARE

## 2019-09-16 VITALS
TEMPERATURE: 97 F | BODY MASS INDEX: 24.8 KG/M2 | HEART RATE: 84 BPM | WEIGHT: 158 LBS | HEIGHT: 67 IN | OXYGEN SATURATION: 99 %

## 2019-09-16 DIAGNOSIS — M77.42 METATARSALGIA, LEFT FOOT: ICD-10-CM

## 2019-09-16 DIAGNOSIS — M77.41 METATARSALGIA, RIGHT FOOT: ICD-10-CM

## 2019-09-16 DIAGNOSIS — M21.611 BUNION, RIGHT: ICD-10-CM

## 2019-09-16 DIAGNOSIS — S90.852A FOREIGN BODY IN LEFT FOOT, INITIAL ENCOUNTER: Primary | ICD-10-CM

## 2019-09-16 PROCEDURE — 3017F COLORECTAL CA SCREEN DOC REV: CPT | Performed by: PODIATRIST

## 2019-09-16 PROCEDURE — 3014F SCREEN MAMMO DOC REV: CPT | Performed by: PODIATRIST

## 2019-09-16 PROCEDURE — 1123F ACP DISCUSS/DSCN MKR DOCD: CPT | Performed by: PODIATRIST

## 2019-09-16 PROCEDURE — 1090F PRES/ABSN URINE INCON ASSESS: CPT | Performed by: PODIATRIST

## 2019-09-16 PROCEDURE — 99213 OFFICE O/P EST LOW 20 MIN: CPT | Performed by: PODIATRIST

## 2019-09-16 PROCEDURE — G8400 PT W/DXA NO RESULTS DOC: HCPCS | Performed by: PODIATRIST

## 2019-09-16 PROCEDURE — G8420 CALC BMI NORM PARAMETERS: HCPCS | Performed by: PODIATRIST

## 2019-09-16 PROCEDURE — G8427 DOCREV CUR MEDS BY ELIG CLIN: HCPCS | Performed by: PODIATRIST

## 2019-09-16 PROCEDURE — 10120 INC&RMVL FB SUBQ TISS SMPL: CPT | Performed by: PODIATRIST

## 2019-09-16 PROCEDURE — 4040F PNEUMOC VAC/ADMIN/RCVD: CPT | Performed by: PODIATRIST

## 2019-09-16 PROCEDURE — 1036F TOBACCO NON-USER: CPT | Performed by: PODIATRIST

## 2019-09-30 ENCOUNTER — OFFICE VISIT (OUTPATIENT)
Dept: PODIATRY | Age: 69
End: 2019-09-30
Payer: MEDICARE

## 2019-09-30 VITALS — TEMPERATURE: 97 F | OXYGEN SATURATION: 98 % | HEART RATE: 73 BPM

## 2019-09-30 DIAGNOSIS — M79.671 PAIN IN RIGHT FOOT: ICD-10-CM

## 2019-09-30 DIAGNOSIS — S90.852D FOREIGN BODY IN LEFT FOOT, SUBSEQUENT ENCOUNTER: ICD-10-CM

## 2019-09-30 DIAGNOSIS — S92.504A NONDISPLACED UNSPECIFIED FRACTURE OF RIGHT LESSER TOE(S), INITIAL ENCOUNTER FOR CLOSED FRACTURE: Primary | ICD-10-CM

## 2019-09-30 PROCEDURE — 3017F COLORECTAL CA SCREEN DOC REV: CPT | Performed by: PODIATRIST

## 2019-09-30 PROCEDURE — 99213 OFFICE O/P EST LOW 20 MIN: CPT | Performed by: PODIATRIST

## 2019-09-30 PROCEDURE — 1123F ACP DISCUSS/DSCN MKR DOCD: CPT | Performed by: PODIATRIST

## 2019-09-30 PROCEDURE — 1036F TOBACCO NON-USER: CPT | Performed by: PODIATRIST

## 2019-09-30 PROCEDURE — G8400 PT W/DXA NO RESULTS DOC: HCPCS | Performed by: PODIATRIST

## 2019-09-30 PROCEDURE — 3014F SCREEN MAMMO DOC REV: CPT | Performed by: PODIATRIST

## 2019-09-30 PROCEDURE — G8427 DOCREV CUR MEDS BY ELIG CLIN: HCPCS | Performed by: PODIATRIST

## 2019-09-30 PROCEDURE — G8420 CALC BMI NORM PARAMETERS: HCPCS | Performed by: PODIATRIST

## 2019-09-30 PROCEDURE — 4040F PNEUMOC VAC/ADMIN/RCVD: CPT | Performed by: PODIATRIST

## 2019-09-30 PROCEDURE — 1090F PRES/ABSN URINE INCON ASSESS: CPT | Performed by: PODIATRIST

## 2019-10-03 DIAGNOSIS — S92.504A NONDISPLACED UNSPECIFIED FRACTURE OF RIGHT LESSER TOE(S), INITIAL ENCOUNTER FOR CLOSED FRACTURE: ICD-10-CM

## 2019-10-11 ENCOUNTER — TELEPHONE (OUTPATIENT)
Dept: PRIMARY CARE CLINIC | Age: 69
End: 2019-10-11

## 2019-10-11 DIAGNOSIS — B00.9 HSV INFECTION: Primary | ICD-10-CM

## 2019-10-11 RX ORDER — ACYCLOVIR 400 MG/1
TABLET ORAL
Qty: 15 TABLET | Refills: 0 | Status: SHIPPED | OUTPATIENT
Start: 2019-10-11 | End: 2019-10-16

## 2019-10-14 RX ORDER — CAL/D3/MAG11/ZINC/COP/MANG/BOR 600 MG-800
1 TABLET ORAL 2 TIMES DAILY
COMMUNITY

## 2019-10-14 RX ORDER — ACYCLOVIR 50 MG/G
OINTMENT TOPICAL
Qty: 15 G | Refills: 1 | Status: SHIPPED | OUTPATIENT
Start: 2019-10-14 | End: 2019-10-21

## 2019-10-24 ENCOUNTER — OFFICE VISIT (OUTPATIENT)
Dept: PRIMARY CARE CLINIC | Age: 69
End: 2019-10-24
Payer: MEDICARE

## 2019-10-24 ENCOUNTER — HOSPITAL ENCOUNTER (OUTPATIENT)
Age: 69
Discharge: HOME | End: 2019-10-26
Payer: MEDICARE

## 2019-10-24 VITALS
TEMPERATURE: 99.1 F | DIASTOLIC BLOOD PRESSURE: 84 MMHG | OXYGEN SATURATION: 98 % | WEIGHT: 159 LBS | HEART RATE: 75 BPM | HEIGHT: 67 IN | RESPIRATION RATE: 16 BRPM | BODY MASS INDEX: 24.96 KG/M2 | SYSTOLIC BLOOD PRESSURE: 124 MMHG

## 2019-10-24 VITALS
HEART RATE: 75 BPM | RESPIRATION RATE: 16 BRPM | BODY MASS INDEX: 24.96 KG/M2 | OXYGEN SATURATION: 98 % | SYSTOLIC BLOOD PRESSURE: 124 MMHG | TEMPERATURE: 99.1 F | HEIGHT: 67 IN | DIASTOLIC BLOOD PRESSURE: 84 MMHG | WEIGHT: 159 LBS

## 2019-10-24 DIAGNOSIS — E87.6 HYPOKALEMIA: ICD-10-CM

## 2019-10-24 DIAGNOSIS — E78.2 MIXED HYPERLIPIDEMIA: ICD-10-CM

## 2019-10-24 DIAGNOSIS — R63.4 WEIGHT LOSS: ICD-10-CM

## 2019-10-24 DIAGNOSIS — Z00.00 ROUTINE GENERAL MEDICAL EXAMINATION AT A HEALTH CARE FACILITY: Primary | ICD-10-CM

## 2019-10-24 DIAGNOSIS — J30.1 NON-SEASONAL ALLERGIC RHINITIS DUE TO POLLEN: ICD-10-CM

## 2019-10-24 DIAGNOSIS — F32.89 OTHER DEPRESSION: ICD-10-CM

## 2019-10-24 DIAGNOSIS — F32.A DEPRESSION, UNSPECIFIED DEPRESSION TYPE: ICD-10-CM

## 2019-10-24 DIAGNOSIS — M47.812 CERVICAL SPONDYLOSIS WITHOUT MYELOPATHY: ICD-10-CM

## 2019-10-24 DIAGNOSIS — I10 ESSENTIAL HYPERTENSION: Primary | ICD-10-CM

## 2019-10-24 LAB
ANION GAP SERPL CALCULATED.3IONS-SCNC: 17 MMOL/L (ref 7–16)
BUN BLDV-MCNC: 16 MG/DL (ref 8–23)
CALCIUM SERPL-MCNC: 9.8 MG/DL (ref 8.6–10.2)
CHLORIDE BLD-SCNC: 103 MMOL/L (ref 98–107)
CHOLESTEROL, TOTAL: 187 MG/DL (ref 0–199)
CO2: 23 MMOL/L (ref 22–29)
CREAT SERPL-MCNC: 0.7 MG/DL (ref 0.5–1)
GFR AFRICAN AMERICAN: >60
GFR NON-AFRICAN AMERICAN: >60 ML/MIN/1.73
GLUCOSE BLD-MCNC: 74 MG/DL (ref 74–99)
HDLC SERPL-MCNC: 54 MG/DL
LDL CHOLESTEROL CALCULATED: 116 MG/DL (ref 0–99)
POTASSIUM SERPL-SCNC: 3.7 MMOL/L (ref 3.5–5)
SODIUM BLD-SCNC: 143 MMOL/L (ref 132–146)
T4 FREE: 1.13 NG/DL (ref 0.93–1.7)
TRIGL SERPL-MCNC: 84 MG/DL (ref 0–149)
TSH SERPL DL<=0.05 MIU/L-ACNC: 1.96 UIU/ML (ref 0.27–4.2)
VLDLC SERPL CALC-MCNC: 17 MG/DL

## 2019-10-24 PROCEDURE — G8427 DOCREV CUR MEDS BY ELIG CLIN: HCPCS | Performed by: INTERNAL MEDICINE

## 2019-10-24 PROCEDURE — G0439 PPPS, SUBSEQ VISIT: HCPCS | Performed by: INTERNAL MEDICINE

## 2019-10-24 PROCEDURE — G9899 SCRN MAM PERF RSLTS DOC: HCPCS | Performed by: INTERNAL MEDICINE

## 2019-10-24 PROCEDURE — G8400 PT W/DXA NO RESULTS DOC: HCPCS | Performed by: INTERNAL MEDICINE

## 2019-10-24 PROCEDURE — 99213 OFFICE O/P EST LOW 20 MIN: CPT | Performed by: INTERNAL MEDICINE

## 2019-10-24 PROCEDURE — G8482 FLU IMMUNIZE ORDER/ADMIN: HCPCS | Performed by: INTERNAL MEDICINE

## 2019-10-24 PROCEDURE — 84443 ASSAY THYROID STIM HORMONE: CPT

## 2019-10-24 PROCEDURE — 36415 COLL VENOUS BLD VENIPUNCTURE: CPT

## 2019-10-24 PROCEDURE — 4040F PNEUMOC VAC/ADMIN/RCVD: CPT | Performed by: INTERNAL MEDICINE

## 2019-10-24 PROCEDURE — 3017F COLORECTAL CA SCREEN DOC REV: CPT | Performed by: INTERNAL MEDICINE

## 2019-10-24 PROCEDURE — 1123F ACP DISCUSS/DSCN MKR DOCD: CPT | Performed by: INTERNAL MEDICINE

## 2019-10-24 PROCEDURE — 1090F PRES/ABSN URINE INCON ASSESS: CPT | Performed by: INTERNAL MEDICINE

## 2019-10-24 PROCEDURE — 1036F TOBACCO NON-USER: CPT | Performed by: INTERNAL MEDICINE

## 2019-10-24 PROCEDURE — 80061 LIPID PANEL: CPT

## 2019-10-24 PROCEDURE — 80048 BASIC METABOLIC PNL TOTAL CA: CPT

## 2019-10-24 PROCEDURE — 84439 ASSAY OF FREE THYROXINE: CPT

## 2019-10-24 PROCEDURE — G8420 CALC BMI NORM PARAMETERS: HCPCS | Performed by: INTERNAL MEDICINE

## 2019-10-24 RX ORDER — DULOXETIN HYDROCHLORIDE 60 MG/1
60 CAPSULE, DELAYED RELEASE ORAL DAILY
Qty: 90 CAPSULE | Refills: 3 | Status: SHIPPED
Start: 2019-10-24 | End: 2020-11-30

## 2019-10-24 RX ORDER — CEPHALEXIN 500 MG/1
500 CAPSULE ORAL 4 TIMES DAILY
COMMUNITY
End: 2020-06-16

## 2019-10-24 ASSESSMENT — ENCOUNTER SYMPTOMS
NAUSEA: 0
VOMITING: 0
EYE ITCHING: 0
SHORTNESS OF BREATH: 0
CONSTIPATION: 0
DIARRHEA: 0
COLOR CHANGE: 0
COUGH: 0
STRIDOR: 0
ANAL BLEEDING: 0
EYE DISCHARGE: 0
WHEEZING: 0
PHOTOPHOBIA: 0
EYE PAIN: 0
ABDOMINAL PAIN: 0
BLOOD IN STOOL: 0
FACIAL SWELLING: 0
TROUBLE SWALLOWING: 0
SORE THROAT: 0
RHINORRHEA: 0

## 2019-10-24 ASSESSMENT — PATIENT HEALTH QUESTIONNAIRE - PHQ9
SUM OF ALL RESPONSES TO PHQ QUESTIONS 1-9: 1
SUM OF ALL RESPONSES TO PHQ QUESTIONS 1-9: 1

## 2019-10-24 ASSESSMENT — LIFESTYLE VARIABLES: HOW OFTEN DO YOU HAVE A DRINK CONTAINING ALCOHOL: 0

## 2019-10-30 ENCOUNTER — OFFICE VISIT (OUTPATIENT)
Dept: PODIATRY | Age: 69
End: 2019-10-30
Payer: MEDICARE

## 2019-10-30 DIAGNOSIS — M79.671 PAIN IN RIGHT FOOT: ICD-10-CM

## 2019-10-30 DIAGNOSIS — M77.41 METATARSALGIA, RIGHT FOOT: ICD-10-CM

## 2019-10-30 DIAGNOSIS — S92.514D NONDISPLACED FRACTURE OF PROXIMAL PHALANX OF RIGHT LESSER TOE(S), SUBSEQUENT ENCOUNTER FOR FRACTURE WITH ROUTINE HEALING: Primary | ICD-10-CM

## 2019-10-30 PROCEDURE — 4040F PNEUMOC VAC/ADMIN/RCVD: CPT | Performed by: PODIATRIST

## 2019-10-30 PROCEDURE — 3017F COLORECTAL CA SCREEN DOC REV: CPT | Performed by: PODIATRIST

## 2019-10-30 PROCEDURE — 99213 OFFICE O/P EST LOW 20 MIN: CPT | Performed by: PODIATRIST

## 2019-10-30 PROCEDURE — G8427 DOCREV CUR MEDS BY ELIG CLIN: HCPCS | Performed by: PODIATRIST

## 2019-10-30 PROCEDURE — G8482 FLU IMMUNIZE ORDER/ADMIN: HCPCS | Performed by: PODIATRIST

## 2019-10-30 PROCEDURE — G9899 SCRN MAM PERF RSLTS DOC: HCPCS | Performed by: PODIATRIST

## 2019-10-30 PROCEDURE — 1090F PRES/ABSN URINE INCON ASSESS: CPT | Performed by: PODIATRIST

## 2019-10-30 PROCEDURE — G8420 CALC BMI NORM PARAMETERS: HCPCS | Performed by: PODIATRIST

## 2019-10-30 PROCEDURE — 1036F TOBACCO NON-USER: CPT | Performed by: PODIATRIST

## 2019-10-30 PROCEDURE — 1123F ACP DISCUSS/DSCN MKR DOCD: CPT | Performed by: PODIATRIST

## 2019-10-30 PROCEDURE — G8400 PT W/DXA NO RESULTS DOC: HCPCS | Performed by: PODIATRIST

## 2019-10-31 DIAGNOSIS — M79.671 PAIN IN RIGHT FOOT: ICD-10-CM

## 2019-10-31 DIAGNOSIS — S92.514D NONDISPLACED FRACTURE OF PROXIMAL PHALANX OF RIGHT LESSER TOE(S), SUBSEQUENT ENCOUNTER FOR FRACTURE WITH ROUTINE HEALING: ICD-10-CM

## 2019-11-29 RX ORDER — TOPIRAMATE 50 MG/1
TABLET, FILM COATED ORAL
Qty: 180 TABLET | Refills: 1 | Status: SHIPPED | OUTPATIENT
Start: 2019-11-29 | End: 2020-01-28 | Stop reason: SDUPTHER

## 2020-01-07 RX ORDER — POTASSIUM CHLORIDE 20 MEQ/1
20 TABLET, EXTENDED RELEASE ORAL DAILY
Qty: 90 TABLET | Refills: 2 | Status: SHIPPED
Start: 2020-01-07 | End: 2020-09-10 | Stop reason: SDUPTHER

## 2020-01-28 ENCOUNTER — HOSPITAL ENCOUNTER (OUTPATIENT)
Age: 70
Discharge: HOME | End: 2020-01-30
Payer: MEDICARE

## 2020-01-28 ENCOUNTER — OFFICE VISIT (OUTPATIENT)
Dept: PRIMARY CARE CLINIC | Age: 70
End: 2020-01-28
Payer: MEDICARE

## 2020-01-28 VITALS
HEART RATE: 68 BPM | HEIGHT: 67 IN | OXYGEN SATURATION: 97 % | DIASTOLIC BLOOD PRESSURE: 62 MMHG | BODY MASS INDEX: 25.74 KG/M2 | TEMPERATURE: 98.7 F | SYSTOLIC BLOOD PRESSURE: 98 MMHG | WEIGHT: 164 LBS

## 2020-01-28 PROBLEM — R22.42 MASS OF LEFT HIP REGION: Status: ACTIVE | Noted: 2020-01-28

## 2020-01-28 PROBLEM — Z78.0 MENOPAUSE: Status: ACTIVE | Noted: 2020-01-28

## 2020-01-28 PROBLEM — G43.009 MIGRAINE WITHOUT AURA AND WITHOUT STATUS MIGRAINOSUS, NOT INTRACTABLE: Status: ACTIVE | Noted: 2020-01-28

## 2020-01-28 LAB
ALBUMIN SERPL-MCNC: 4.4 G/DL (ref 3.5–5.2)
ALP BLD-CCNC: 101 U/L (ref 35–104)
ALT SERPL-CCNC: 19 U/L (ref 0–32)
ANION GAP SERPL CALCULATED.3IONS-SCNC: 15 MMOL/L (ref 7–16)
AST SERPL-CCNC: 27 U/L (ref 0–31)
BILIRUB SERPL-MCNC: 0.5 MG/DL (ref 0–1.2)
BUN BLDV-MCNC: 15 MG/DL (ref 8–23)
CALCIUM SERPL-MCNC: 9.8 MG/DL (ref 8.6–10.2)
CHLORIDE BLD-SCNC: 103 MMOL/L (ref 98–107)
CHOLESTEROL, TOTAL: 171 MG/DL (ref 0–199)
CO2: 25 MMOL/L (ref 22–29)
CREAT SERPL-MCNC: 0.7 MG/DL (ref 0.5–1)
GFR AFRICAN AMERICAN: >60
GFR NON-AFRICAN AMERICAN: >60 ML/MIN/1.73
GLUCOSE BLD-MCNC: 88 MG/DL (ref 74–99)
HDLC SERPL-MCNC: 54 MG/DL
LDL CHOLESTEROL CALCULATED: 94 MG/DL (ref 0–99)
POTASSIUM SERPL-SCNC: 4 MMOL/L (ref 3.5–5)
SODIUM BLD-SCNC: 143 MMOL/L (ref 132–146)
TOTAL PROTEIN: 7.5 G/DL (ref 6.4–8.3)
TRIGL SERPL-MCNC: 116 MG/DL (ref 0–149)
VLDLC SERPL CALC-MCNC: 23 MG/DL

## 2020-01-28 PROCEDURE — G8400 PT W/DXA NO RESULTS DOC: HCPCS | Performed by: INTERNAL MEDICINE

## 2020-01-28 PROCEDURE — 3017F COLORECTAL CA SCREEN DOC REV: CPT | Performed by: INTERNAL MEDICINE

## 2020-01-28 PROCEDURE — G9899 SCRN MAM PERF RSLTS DOC: HCPCS | Performed by: INTERNAL MEDICINE

## 2020-01-28 PROCEDURE — 1036F TOBACCO NON-USER: CPT | Performed by: INTERNAL MEDICINE

## 2020-01-28 PROCEDURE — 36415 COLL VENOUS BLD VENIPUNCTURE: CPT

## 2020-01-28 PROCEDURE — 80061 LIPID PANEL: CPT

## 2020-01-28 PROCEDURE — G8417 CALC BMI ABV UP PARAM F/U: HCPCS | Performed by: INTERNAL MEDICINE

## 2020-01-28 PROCEDURE — 4040F PNEUMOC VAC/ADMIN/RCVD: CPT | Performed by: INTERNAL MEDICINE

## 2020-01-28 PROCEDURE — 99214 OFFICE O/P EST MOD 30 MIN: CPT | Performed by: INTERNAL MEDICINE

## 2020-01-28 PROCEDURE — G8482 FLU IMMUNIZE ORDER/ADMIN: HCPCS | Performed by: INTERNAL MEDICINE

## 2020-01-28 PROCEDURE — G8427 DOCREV CUR MEDS BY ELIG CLIN: HCPCS | Performed by: INTERNAL MEDICINE

## 2020-01-28 PROCEDURE — 80053 COMPREHEN METABOLIC PANEL: CPT

## 2020-01-28 PROCEDURE — 1123F ACP DISCUSS/DSCN MKR DOCD: CPT | Performed by: INTERNAL MEDICINE

## 2020-01-28 PROCEDURE — 1090F PRES/ABSN URINE INCON ASSESS: CPT | Performed by: INTERNAL MEDICINE

## 2020-01-28 RX ORDER — AMLODIPINE BESYLATE 10 MG/1
10 TABLET ORAL DAILY
Qty: 90 TABLET | Refills: 2 | Status: CANCELLED | OUTPATIENT
Start: 2020-01-28

## 2020-01-28 RX ORDER — ATORVASTATIN CALCIUM 10 MG/1
10 TABLET, FILM COATED ORAL DAILY
Qty: 90 TABLET | Refills: 2 | Status: SHIPPED
Start: 2020-01-28 | End: 2020-06-16

## 2020-01-28 RX ORDER — HYDROCHLOROTHIAZIDE 25 MG/1
25 TABLET ORAL DAILY
Qty: 30 TABLET | Refills: 2 | Status: CANCELLED | OUTPATIENT
Start: 2020-01-28

## 2020-01-28 RX ORDER — TOPIRAMATE 50 MG/1
50 TABLET, FILM COATED ORAL DAILY
Qty: 180 TABLET | Refills: 1 | Status: SHIPPED
Start: 2020-01-28 | End: 2021-02-01

## 2020-01-28 RX ORDER — AMLODIPINE BESYLATE 5 MG/1
5 TABLET ORAL DAILY
Qty: 90 TABLET | Refills: 5 | Status: SHIPPED
Start: 2020-01-28 | End: 2021-04-05

## 2020-01-28 RX ORDER — ATORVASTATIN CALCIUM 20 MG/1
TABLET, FILM COATED ORAL
COMMUNITY
Start: 2019-11-29 | End: 2020-09-16

## 2020-01-28 ASSESSMENT — ENCOUNTER SYMPTOMS
RHINORRHEA: 0
ABDOMINAL PAIN: 0
FACIAL SWELLING: 0
COLOR CHANGE: 0
EYE DISCHARGE: 0
NAUSEA: 0
VOMITING: 0
SORE THROAT: 0
STRIDOR: 0
EYE PAIN: 0
CONSTIPATION: 1
COUGH: 0
WHEEZING: 0
BLOOD IN STOOL: 0
PHOTOPHOBIA: 0
DIARRHEA: 0
SHORTNESS OF BREATH: 0
ANAL BLEEDING: 0
TROUBLE SWALLOWING: 0
EYE ITCHING: 0

## 2020-01-28 NOTE — PROGRESS NOTES
for cough, shortness of breath, wheezing and stridor. Cardiovascular: Negative for chest pain, palpitations and leg swelling. Gastrointestinal: Positive for constipation. Negative for abdominal pain, anal bleeding, blood in stool, diarrhea, nausea and vomiting. Endocrine: Negative for cold intolerance, heat intolerance, polydipsia, polyphagia and polyuria. Genitourinary: Negative for difficulty urinating, dysuria, flank pain, frequency, hematuria and urgency. Musculoskeletal: Negative for arthralgias, gait problem, joint swelling and myalgias. See hpi   Skin: Negative for color change, pallor and rash. Allergic/Immunologic: Negative for environmental allergies and food allergies. Neurological: Negative for dizziness, tremors, seizures, syncope, speech difficulty, weakness, light-headedness, numbness and headaches. Hematological: Negative for adenopathy. Does not bruise/bleed easily. Psychiatric/Behavioral: Negative for agitation, behavioral problems, confusion, sleep disturbance and suicidal ideas. The patient is not nervous/anxious.            Current Outpatient Medications:     atorvastatin (LIPITOR) 10 MG tablet, Take 1 tablet by mouth daily, Disp: 90 tablet, Rfl: 2    topiramate (TOPAMAX) 50 MG tablet, Take 1 tablet by mouth daily, Disp: 180 tablet, Rfl: 1    atorvastatin (LIPITOR) 20 MG tablet, , Disp: , Rfl:     amLODIPine (NORVASC) 5 MG tablet, Take 1 tablet by mouth daily, Disp: 90 tablet, Rfl: 5    potassium chloride (KLOR-CON M) 20 MEQ extended release tablet, Take 1 tablet by mouth daily, Disp: 90 tablet, Rfl: 2    DULoxetine (CYMBALTA) 60 MG extended release capsule, Take 1 capsule by mouth daily, Disp: 90 capsule, Rfl: 3    cephALEXin (KEFLEX) 500 MG capsule, Take 500 mg by mouth 4 times daily, Disp: , Rfl:     CALTRATE 600+D PLUS MINERALS (CALTRATE) 600-800 MG-UNIT TABS tablet, Take 1 tablet by mouth 2 times daily, Disp: , Rfl:     RA COL-RITE 100 MG capsule, Take 1 Weight: 164 lb (74.4 kg)   Height: 5' 7\" (1.702 m)        Exam:  Physical Exam  Constitutional:       Appearance: She is well-developed. HENT:      Head: Normocephalic. Right Ear: External ear normal.      Left Ear: External ear normal.      Nose: Nose normal.      Mouth/Throat:      Pharynx: No oropharyngeal exudate. Eyes:      General: No scleral icterus. Right eye: No discharge. Left eye: No discharge. Conjunctiva/sclera: Conjunctivae normal.      Pupils: Pupils are equal, round, and reactive to light. Neck:      Musculoskeletal: Normal range of motion and neck supple. Thyroid: No thyromegaly. Cardiovascular:      Rate and Rhythm: Normal rate and regular rhythm. Heart sounds: Normal heart sounds. No murmur. No friction rub. No gallop. Pulmonary:      Effort: Pulmonary effort is normal. No respiratory distress. Breath sounds: Normal breath sounds. No wheezing or rales. Chest:      Chest wall: No tenderness. Abdominal:      General: Bowel sounds are normal. There is no distension. Palpations: Abdomen is soft. There is no mass. Tenderness: There is no abdominal tenderness. There is no guarding or rebound. Musculoskeletal: Normal range of motion. General: No tenderness or deformity. Comments: Bunion right great toe  She has an approximately 3 x 5 cm mass on her left anterior hip that feels like a lipoma. Lymphadenopathy:      Cervical: No cervical adenopathy. Skin:     General: Skin is warm and dry. Coloration: Skin is not pale. Findings: No erythema or rash. Neurological:      Mental Status: She is alert and oriented to person, place, and time. Cranial Nerves: No cranial nerve deficit. Sensory: No sensory deficit. Deep Tendon Reflexes: Reflexes normal.   Psychiatric:         Behavior: Behavior normal.         Thought Content:  Thought content normal.         Judgment: Judgment normal.          Last labs

## 2020-01-28 NOTE — ASSESSMENT & PLAN NOTE
Blood pressures are too low. Cut the amlodipine to 5 mg a day. Discontinue hydrochlorothiazide. Monitor blood pressures at home and let me know if they rise above 073 systolic or above 86 diastolic. Chandan Carlson   Check CMP

## 2020-04-21 RX ORDER — HYDROCHLOROTHIAZIDE 25 MG/1
TABLET ORAL
Qty: 90 TABLET | Refills: 3 | Status: SHIPPED
Start: 2020-04-21 | End: 2021-04-16

## 2020-05-27 RX ORDER — ERGOCALCIFEROL 1.25 MG/1
CAPSULE ORAL
Qty: 6 CAPSULE | Refills: 3 | Status: SHIPPED
Start: 2020-05-27 | End: 2021-04-28

## 2020-06-16 ENCOUNTER — HOSPITAL ENCOUNTER (OUTPATIENT)
Age: 70
Discharge: HOME | End: 2020-06-18
Payer: MEDICARE

## 2020-06-16 ENCOUNTER — OFFICE VISIT (OUTPATIENT)
Dept: PRIMARY CARE CLINIC | Age: 70
End: 2020-06-16
Payer: MEDICARE

## 2020-06-16 VITALS
OXYGEN SATURATION: 96 % | SYSTOLIC BLOOD PRESSURE: 135 MMHG | HEART RATE: 85 BPM | BODY MASS INDEX: 26.47 KG/M2 | WEIGHT: 169 LBS | DIASTOLIC BLOOD PRESSURE: 88 MMHG

## 2020-06-16 PROBLEM — Z12.31 ENCOUNTER FOR SCREENING MAMMOGRAM FOR MALIGNANT NEOPLASM OF BREAST: Status: ACTIVE | Noted: 2020-06-16

## 2020-06-16 LAB
ALBUMIN SERPL-MCNC: 4.3 G/DL (ref 3.5–5.2)
ALP BLD-CCNC: 74 U/L (ref 35–104)
ALT SERPL-CCNC: 18 U/L (ref 0–32)
ANION GAP SERPL CALCULATED.3IONS-SCNC: 15 MMOL/L (ref 7–16)
AST SERPL-CCNC: 32 U/L (ref 0–31)
BILIRUB SERPL-MCNC: 0.4 MG/DL (ref 0–1.2)
BUN BLDV-MCNC: 17 MG/DL (ref 8–23)
CALCIUM SERPL-MCNC: 9.6 MG/DL (ref 8.6–10.2)
CHLORIDE BLD-SCNC: 106 MMOL/L (ref 98–107)
CHOLESTEROL, TOTAL: 178 MG/DL (ref 0–199)
CO2: 21 MMOL/L (ref 22–29)
CREAT SERPL-MCNC: 0.6 MG/DL (ref 0.5–1)
GFR AFRICAN AMERICAN: >60
GFR NON-AFRICAN AMERICAN: >60 ML/MIN/1.73
GLUCOSE BLD-MCNC: 96 MG/DL (ref 74–99)
HDLC SERPL-MCNC: 52 MG/DL
LDL CHOLESTEROL CALCULATED: 101 MG/DL (ref 0–99)
POTASSIUM SERPL-SCNC: 4 MMOL/L (ref 3.5–5)
SODIUM BLD-SCNC: 142 MMOL/L (ref 132–146)
TOTAL PROTEIN: 7.7 G/DL (ref 6.4–8.3)
TRIGL SERPL-MCNC: 123 MG/DL (ref 0–149)
VLDLC SERPL CALC-MCNC: 25 MG/DL

## 2020-06-16 PROCEDURE — 4040F PNEUMOC VAC/ADMIN/RCVD: CPT | Performed by: INTERNAL MEDICINE

## 2020-06-16 PROCEDURE — G8417 CALC BMI ABV UP PARAM F/U: HCPCS | Performed by: INTERNAL MEDICINE

## 2020-06-16 PROCEDURE — 1036F TOBACCO NON-USER: CPT | Performed by: INTERNAL MEDICINE

## 2020-06-16 PROCEDURE — 36415 COLL VENOUS BLD VENIPUNCTURE: CPT

## 2020-06-16 PROCEDURE — G8399 PT W/DXA RESULTS DOCUMENT: HCPCS | Performed by: INTERNAL MEDICINE

## 2020-06-16 PROCEDURE — 80061 LIPID PANEL: CPT

## 2020-06-16 PROCEDURE — G8427 DOCREV CUR MEDS BY ELIG CLIN: HCPCS | Performed by: INTERNAL MEDICINE

## 2020-06-16 PROCEDURE — 3017F COLORECTAL CA SCREEN DOC REV: CPT | Performed by: INTERNAL MEDICINE

## 2020-06-16 PROCEDURE — 1090F PRES/ABSN URINE INCON ASSESS: CPT | Performed by: INTERNAL MEDICINE

## 2020-06-16 PROCEDURE — G9899 SCRN MAM PERF RSLTS DOC: HCPCS | Performed by: INTERNAL MEDICINE

## 2020-06-16 PROCEDURE — 80053 COMPREHEN METABOLIC PANEL: CPT

## 2020-06-16 PROCEDURE — 1123F ACP DISCUSS/DSCN MKR DOCD: CPT | Performed by: INTERNAL MEDICINE

## 2020-06-16 PROCEDURE — 99214 OFFICE O/P EST MOD 30 MIN: CPT | Performed by: INTERNAL MEDICINE

## 2020-06-16 ASSESSMENT — ENCOUNTER SYMPTOMS
COLOR CHANGE: 0
COUGH: 0
RHINORRHEA: 0
SORE THROAT: 0
FACIAL SWELLING: 0
EYE PAIN: 0
VOMITING: 0
ABDOMINAL PAIN: 0
SHORTNESS OF BREATH: 0
STRIDOR: 0
ANAL BLEEDING: 0
CONSTIPATION: 1
BLOOD IN STOOL: 0
NAUSEA: 0
EYE DISCHARGE: 0
TROUBLE SWALLOWING: 0
WHEEZING: 0
EYE ITCHING: 0
PHOTOPHOBIA: 0
DIARRHEA: 0

## 2020-07-07 ENCOUNTER — TELEPHONE (OUTPATIENT)
Dept: PRIMARY CARE CLINIC | Age: 70
End: 2020-07-07

## 2020-07-07 PROBLEM — R10.84 GENERALIZED ABDOMINAL PAIN: Status: ACTIVE | Noted: 2020-07-07

## 2020-07-07 RX ORDER — DICYCLOMINE HYDROCHLORIDE 10 MG/1
10 CAPSULE ORAL 4 TIMES DAILY
Qty: 40 CAPSULE | Refills: 0 | Status: SHIPPED
Start: 2020-07-07 | End: 2021-06-17

## 2020-07-07 NOTE — TELEPHONE ENCOUNTER
Pt not feeling good fever and upset stomach would liek something called over advised her if she is still having symptoms to been seen within the day or two

## 2020-07-16 PROBLEM — Z12.31 ENCOUNTER FOR SCREENING MAMMOGRAM FOR MALIGNANT NEOPLASM OF BREAST: Status: RESOLVED | Noted: 2020-06-16 | Resolved: 2020-07-16

## 2020-08-20 ENCOUNTER — TELEPHONE (OUTPATIENT)
Dept: PRIMARY CARE CLINIC | Age: 70
End: 2020-08-20

## 2020-08-20 NOTE — TELEPHONE ENCOUNTER
Patient informed to stay on hydrochlorothiazide. Agreeable.     Electronically signed by Mara Lang LPN on 8/44/4865 at 41:28 PM

## 2020-08-20 NOTE — TELEPHONE ENCOUNTER
Last Appointment:  6/16/2020  Future Appointments   Date Time Provider Mary Carmen Tran   9/16/2020  7:00 AM [address] [facility], . [address] [facility]      Patient left message asking if she needs to continue with her hydrochlorothiazide. She reports it was sent in for refills.   Please advise I'll call her back and removed med from medication list.    Electronically signed by Dae Wilkins LPN on 4/35/2014 at 86:31 PM

## 2020-09-10 RX ORDER — POTASSIUM CHLORIDE 1500 MG/1
TABLET, EXTENDED RELEASE ORAL
Qty: 90 TABLET | Refills: 3 | Status: SHIPPED
Start: 2020-09-10 | End: 2021-09-07

## 2020-09-16 ENCOUNTER — OFFICE VISIT (OUTPATIENT)
Dept: PRIMARY CARE CLINIC | Age: 70
End: 2020-09-16
Payer: MEDICARE

## 2020-09-16 VITALS
DIASTOLIC BLOOD PRESSURE: 78 MMHG | OXYGEN SATURATION: 95 % | WEIGHT: 174 LBS | SYSTOLIC BLOOD PRESSURE: 110 MMHG | HEART RATE: 85 BPM | TEMPERATURE: 97.8 F | BODY MASS INDEX: 27.25 KG/M2

## 2020-09-16 PROBLEM — R10.84 GENERALIZED ABDOMINAL PAIN: Status: RESOLVED | Noted: 2020-07-07 | Resolved: 2020-09-16

## 2020-09-16 PROBLEM — E87.6 HYPOKALEMIA: Status: RESOLVED | Noted: 2019-09-05 | Resolved: 2020-09-16

## 2020-09-16 PROBLEM — M25.551 PAIN OF RIGHT HIP JOINT: Status: ACTIVE | Noted: 2020-09-16

## 2020-09-16 PROCEDURE — 4040F PNEUMOC VAC/ADMIN/RCVD: CPT | Performed by: INTERNAL MEDICINE

## 2020-09-16 PROCEDURE — 3017F COLORECTAL CA SCREEN DOC REV: CPT | Performed by: INTERNAL MEDICINE

## 2020-09-16 PROCEDURE — 1123F ACP DISCUSS/DSCN MKR DOCD: CPT | Performed by: INTERNAL MEDICINE

## 2020-09-16 PROCEDURE — 99214 OFFICE O/P EST MOD 30 MIN: CPT | Performed by: INTERNAL MEDICINE

## 2020-09-16 PROCEDURE — 1090F PRES/ABSN URINE INCON ASSESS: CPT | Performed by: INTERNAL MEDICINE

## 2020-09-16 PROCEDURE — G8427 DOCREV CUR MEDS BY ELIG CLIN: HCPCS | Performed by: INTERNAL MEDICINE

## 2020-09-16 PROCEDURE — G9899 SCRN MAM PERF RSLTS DOC: HCPCS | Performed by: INTERNAL MEDICINE

## 2020-09-16 PROCEDURE — G8417 CALC BMI ABV UP PARAM F/U: HCPCS | Performed by: INTERNAL MEDICINE

## 2020-09-16 PROCEDURE — 1036F TOBACCO NON-USER: CPT | Performed by: INTERNAL MEDICINE

## 2020-09-16 PROCEDURE — G8399 PT W/DXA RESULTS DOCUMENT: HCPCS | Performed by: INTERNAL MEDICINE

## 2020-09-16 RX ORDER — ATORVASTATIN CALCIUM 10 MG/1
TABLET, FILM COATED ORAL
COMMUNITY
Start: 2020-07-23 | End: 2020-10-21

## 2020-09-16 ASSESSMENT — ENCOUNTER SYMPTOMS
TROUBLE SWALLOWING: 0
DIARRHEA: 0
EYE PAIN: 0
EYE DISCHARGE: 0
COLOR CHANGE: 0
COUGH: 0
CONSTIPATION: 1
EYE ITCHING: 0
STRIDOR: 0
SHORTNESS OF BREATH: 0
SORE THROAT: 0
PHOTOPHOBIA: 0
VOMITING: 0
BLOOD IN STOOL: 0
FACIAL SWELLING: 0
NAUSEA: 0
RHINORRHEA: 0
WHEEZING: 0
ABDOMINAL PAIN: 0
ANAL BLEEDING: 0

## 2020-09-16 NOTE — ASSESSMENT & PLAN NOTE
X-ray of her right hip was done and this showed degenerative changes.   Referral made to Dr. Kostas Gómez for further evaluation and treatment

## 2020-09-16 NOTE — PROGRESS NOTES
2020    Name: Brent Burnett : 1950 Sex: female  Age: 79 y.o. Subjective:  Chief Complaint   Patient presents with    Hyperlipidemia    Hypertension        Hyperlipidemia   Pertinent negatives include no chest pain, myalgias or shortness of breath. Hypertension   Pertinent negatives include no chest pain, headaches, palpitations or shortness of breath. About 2 months ago she had diarrhea, temp of 102, malaise and myalgias. She did not go to the flu clinic as directed she stayed home and \"rolled out\". She did much better. She never had any cough or shortness of breath. I told her she probably had COVID-19 infection but it was not worth it to check antibodies because of the limitations in the screening process ,she agrees. She is complaining of increased hip pain in the right she wants to see Dr. Sonia Miranda. She has a history of hypertension, hyperlipidemia, knee arthritis, urinary frequency, and depression. Pressures at home are very good since adjusting her medications. She is on Cymbalta 60 mg a day and does well on this. She has no symptoms of anhedonia, hypersomnolence or suicidal or homicidal ideations. She had her knee surgery earlier this year and  had an InterStim implant with her urologist.  She no longer has urgency and urinary frequency. She cannot have any dental procedures until a few months of past and she will need antibiotic prophylaxis for this. ..    She takes topiramate for prophylaxis against migraines since she has had no migraine headaches. She complains of constipation. She does not take MiraLAX and I recommended that she start MiraLAX. Colonoscopy done by Dr Stephanie Fox. in 2020 showed diverticuli and tortuous colon with spasms. Her sister has severe osteoporosis . Bone density-showed  lowest T score of -1.2 in her hip.   She continues on vitamin D3 and calcium from food sources  She had her mammogram in 2020 and this showed no mammographic evidence of malignancy. She found a lump on her left anterior hip. She says the left side of her body is larger than the right side. 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 She does not want any surgery at this time. Reviewed blood work from June 2020 and her CMP and fasting lipid profile were excellent. Except for LDL of 101. Review of Systems   Constitutional: Negative for appetite change, fatigue and unexpected weight change. HENT: Negative for congestion, ear pain, facial swelling, postnasal drip, rhinorrhea, sore throat, tinnitus and trouble swallowing. Eyes: Negative for photophobia, pain, discharge, itching and visual disturbance. Respiratory: Negative for cough, shortness of breath, wheezing and stridor. Cardiovascular: Negative for chest pain, palpitations and leg swelling. Gastrointestinal: Positive for constipation. Negative for abdominal pain, anal bleeding, blood in stool, diarrhea, nausea and vomiting. Endocrine: Negative for cold intolerance, heat intolerance, polydipsia, polyphagia and polyuria. Genitourinary: Negative for difficulty urinating, dysuria, flank pain, frequency, hematuria and urgency. Musculoskeletal: Negative for arthralgias, gait problem, joint swelling and myalgias. See hpi   Skin: Negative for color change, pallor and rash. Allergic/Immunologic: Negative for environmental allergies and food allergies. Neurological: Negative for dizziness, tremors, seizures, syncope, speech difficulty, weakness, light-headedness, numbness and headaches. Hematological: Negative for adenopathy. Does not bruise/bleed easily. Psychiatric/Behavioral: Negative for agitation, behavioral problems, confusion, sleep disturbance and suicidal ideas. The patient is nervous/anxious.            Current Outpatient Medications:     atorvastatin (LIPITOR) 10 MG tablet, , Disp: , Rfl:     KLOR-CON M20 20 MEQ extended release tablet, TAKE 1 TABLET DAILY, Disp: 90 tablet, Rfl: 3    dicyclomine (BENTYL) 10 MG capsule, Take 1 capsule by mouth 4 times daily As needed for abdominal pain, Disp: 40 capsule, Rfl: 0    vitamin D (ERGOCALCIFEROL) 1.25 MG (67152 UT) CAPS capsule, TAKE 1 CAPSULE EVERY TWO WEEKS, Disp: 6 capsule, Rfl: 3    hydroCHLOROthiazide (HYDRODIURIL) 25 MG tablet, TAKE 1 TABLET DAILY, Disp: 90 tablet, Rfl: 3    topiramate (TOPAMAX) 50 MG tablet, Take 1 tablet by mouth daily, Disp: 180 tablet, Rfl: 1    DULoxetine (CYMBALTA) 60 MG extended release capsule, Take 1 capsule by mouth daily, Disp: 90 capsule, Rfl: 3    CALTRATE 600+D PLUS MINERALS (CALTRATE) 600-800 MG-UNIT TABS tablet, Take 1 tablet by mouth 2 times daily, Disp: , Rfl:     RA COL-RITE 100 MG capsule, Take 1 capsule by mouth daily as needed, Disp: , Rfl: 0    Multiple Vitamins-Minerals (PRESERVISION AREDS 2+MULTI VIT PO), Take by mouth, Disp: , Rfl:     Coenzyme Q10 200 MG CAPS, Take 100 mg by mouth daily , Disp: , Rfl:     COATED ASPIRIN PO, Take 1 tablet by mouth 2 times daily , Disp: , Rfl:     amLODIPine (NORVASC) 5 MG tablet, Take 1 tablet by mouth daily, Disp: 90 tablet, Rfl: 5     No Known Allergies     Past Medical History:   Diagnosis Date    Chronic back pain     Depression     Headache(784.0)     Hyperlipidemia     Osteoarthritis     TIA (transient ischemic attack)        Health Maintenance Due   Topic Date Due    Hepatitis C screen  1950    Pneumococcal 65+ years Vaccine (2 of 2 - PPSV23) 06/13/2019    Flu vaccine (1) 09/01/2020        Patient Active Problem List   Diagnosis    Cervical spondylosis without myelopathy    TIA (transient ischemic attack)    Essential hypertension    Knee arthropathy    Other chest pain    Depression    Hyperlipidemia    Drug-induced constipation    Weight loss    Frequency of urination    Non-seasonal allergic rhinitis due to pollen    Migraine without aura and without status migrainosus, not intractable    Menopause    Mass of left hip region    Pain of right hip joint        Past Surgical History:   Procedure Laterality Date    HYSTERECTOMY      JOINT REPLACEMENT  2007    Knee surgery    SPINE SURGERY  2001    neck surgery with Dr Karly Rosado        Family History   Problem Relation Age of Onset    Diabetes Mother         Social History     Tobacco Use    Smoking status: Never Smoker    Smokeless tobacco: Never Used   Substance Use Topics    Alcohol use: No    Drug use: No        Objective  Vitals:    09/16/20 0701   BP: 110/78   Pulse: 85   Temp: 97.8 °F (36.6 °C)   TempSrc: Temporal   SpO2: 95%   Weight: 174 lb (78.9 kg)        Exam:  Physical Exam  Constitutional:       Appearance: She is well-developed. HENT:      Head: Normocephalic. Right Ear: External ear normal.      Left Ear: External ear normal.      Nose: Nose normal.      Mouth/Throat:      Pharynx: No oropharyngeal exudate. Eyes:      General: No scleral icterus. Right eye: No discharge. Left eye: No discharge. Conjunctiva/sclera: Conjunctivae normal.      Pupils: Pupils are equal, round, and reactive to light. Neck:      Musculoskeletal: Normal range of motion and neck supple. Thyroid: No thyromegaly. Cardiovascular:      Rate and Rhythm: Normal rate and regular rhythm. Heart sounds: Normal heart sounds. No murmur. No friction rub. No gallop. Pulmonary:      Effort: Pulmonary effort is normal. No respiratory distress. Breath sounds: Normal breath sounds. No wheezing or rales. Chest:      Chest wall: No tenderness. Abdominal:      General: Bowel sounds are normal. There is no distension. Palpations: Abdomen is soft. There is no mass. Tenderness: There is no abdominal tenderness. There is no guarding or rebound. Musculoskeletal: Normal range of motion. General: No tenderness or deformity.       Comments: Bunion right great toe  She has an approximately 3 x 5 cm mass on her left anterior hip that feels like a lipoma. Tenderness to palpation right hip. Lymphadenopathy:      Cervical: No cervical adenopathy. Skin:     General: Skin is warm and dry. Coloration: Skin is not pale. Findings: No erythema or rash. Neurological:      Mental Status: She is alert and oriented to person, place, and time. Cranial Nerves: No cranial nerve deficit. Sensory: No sensory deficit. Deep Tendon Reflexes: Reflexes normal.   Psychiatric:         Behavior: Behavior normal.         Thought Content: Thought content normal.         Judgment: Judgment normal.          Last labs reviewed. ASSESSMENT & PLAN :   Problem List        Circulatory    Essential hypertension - Primary     Blood pressures are stable. Continue medications and monitor blood pressures at home. Call office if systolics are over 217 over diastolics over 90. Other    Depression     Continue duloxetine this is working well for her         Relevant Medications    DULoxetine (CYMBALTA) 60 MG extended release capsule    Hyperlipidemia     Watch saturated fats in diet and will monitor lipids         Relevant Medications    COATED ASPIRIN PO    hydroCHLOROthiazide (HYDRODIURIL) 25 MG tablet    atorvastatin (LIPITOR) 10 MG tablet    Pain of right hip joint     X-ray of her right hip was done and this showed degenerative changes. Referral made to Dr. Areli Morales for further evaluation and treatment         Relevant Orders    XR HIP RIGHT (2-3 VIEWS)    Amb External Referral To Orthopedic Surgery           Return in about 6 weeks (around 10/26/2020), or AWV.        Lidia Brian DO  9/16/2020

## 2020-10-21 RX ORDER — ATORVASTATIN CALCIUM 10 MG/1
TABLET, FILM COATED ORAL
Qty: 90 TABLET | Refills: 3 | Status: SHIPPED
Start: 2020-10-21 | End: 2021-10-18

## 2020-10-26 ENCOUNTER — OFFICE VISIT (OUTPATIENT)
Dept: PRIMARY CARE CLINIC | Age: 70
End: 2020-10-26
Payer: MEDICARE

## 2020-10-26 VITALS
DIASTOLIC BLOOD PRESSURE: 80 MMHG | SYSTOLIC BLOOD PRESSURE: 115 MMHG | OXYGEN SATURATION: 97 % | HEART RATE: 87 BPM | WEIGHT: 174 LBS | BODY MASS INDEX: 27.25 KG/M2 | TEMPERATURE: 97.8 F

## 2020-10-26 PROCEDURE — 90694 VACC AIIV4 NO PRSRV 0.5ML IM: CPT | Performed by: INTERNAL MEDICINE

## 2020-10-26 PROCEDURE — G0008 ADMIN INFLUENZA VIRUS VAC: HCPCS | Performed by: INTERNAL MEDICINE

## 2020-10-26 PROCEDURE — 4040F PNEUMOC VAC/ADMIN/RCVD: CPT | Performed by: INTERNAL MEDICINE

## 2020-10-26 PROCEDURE — 1123F ACP DISCUSS/DSCN MKR DOCD: CPT | Performed by: INTERNAL MEDICINE

## 2020-10-26 PROCEDURE — G0439 PPPS, SUBSEQ VISIT: HCPCS | Performed by: INTERNAL MEDICINE

## 2020-10-26 PROCEDURE — G8484 FLU IMMUNIZE NO ADMIN: HCPCS | Performed by: INTERNAL MEDICINE

## 2020-10-26 PROCEDURE — 3017F COLORECTAL CA SCREEN DOC REV: CPT | Performed by: INTERNAL MEDICINE

## 2020-10-26 ASSESSMENT — PATIENT HEALTH QUESTIONNAIRE - PHQ9
1. LITTLE INTEREST OR PLEASURE IN DOING THINGS: 0
SUM OF ALL RESPONSES TO PHQ9 QUESTIONS 1 & 2: 0
SUM OF ALL RESPONSES TO PHQ QUESTIONS 1-9: 0
2. FEELING DOWN, DEPRESSED OR HOPELESS: 0

## 2020-10-26 ASSESSMENT — LIFESTYLE VARIABLES
HOW OFTEN DO YOU HAVE A DRINK CONTAINING ALCOHOL: 0
AUDIT TOTAL SCORE: INCOMPLETE
HOW OFTEN DO YOU HAVE A DRINK CONTAINING ALCOHOL: NEVER
AUDIT-C TOTAL SCORE: INCOMPLETE

## 2020-10-26 NOTE — PATIENT INSTRUCTIONS
Personalized Preventive Plan for Lolis Cornejo - 10/26/2020  Medicare offers a range of preventive health benefits. Some of the tests and screenings are paid in full while other may be subject to a deductible, co-insurance, and/or copay. Some of these benefits include a comprehensive review of your medical history including lifestyle, illnesses that may run in your family, and various assessments and screenings as appropriate. After reviewing your medical record and screening and assessments performed today your provider may have ordered immunizations, labs, imaging, and/or referrals for you. A list of these orders (if applicable) as well as your Preventive Care list are included within your After Visit Summary for your review. Other Preventive Recommendations:    · A preventive eye exam performed by an eye specialist is recommended every 1-2 years to screen for glaucoma; cataracts, macular degeneration, and other eye disorders. · A preventive dental visit is recommended every 6 months. · Try to get at least 150 minutes of exercise per week or 10,000 steps per day on a pedometer . · Order or download the FREE \"Exercise & Physical Activity: Your Everyday Guide\" from [employer]. Call 7-921.318.3374 or search [employer] online. · You need 0144-6797 mg of calcium and 4389-3284 IU of vitamin D per day. It is possible to meet your calcium requirement with diet alone, but a vitamin D supplement is usually necessary to meet this goal.  · When exposed to the sun, use a sunscreen that protects against both UVA and UVB radiation with an SPF of 30 or greater. Reapply every 2 to 3 hours or after sweating, drying off with a towel, or swimming. · Always wear a seat belt when traveling in a car. Always wear a helmet when riding a bicycle or motorcycle.

## 2020-10-26 NOTE — PROGRESS NOTES
Vaccine Information Sheet, \"Influenza - Inactivated\"  given to Halina Sailors, or parent/legal guardian of  Halina Sailors and verbalized understanding. Patient responses:    Have you ever had a reaction to a flu vaccine? No  Do you have any current illness? No  Have you ever had Guillian Stockton Syndrome? No  Do you have a serious allergy to any of the follow: Neomycin, Polymyxin, Thimerosal, eggs or egg products? No    Flu vaccine given per order. Please see immunization tab. Risks and benefits explained. Current VIS given.       Immunizations Administered     Name Date Dose Route    Influenza, Quadv, adjuvanted, 65 yrs +, IM, PF (Fluad) 10/26/2020 0.5 mL Intramuscular    Site: Deltoid- Left    Lot: [id number]    NDC: 94383-395-84

## 2020-10-26 NOTE — PROGRESS NOTES
Medicare Annual Wellness Visit  Name: Britney Pedraza Date: 10/26/2020   MRN: <[id number]> Sex: Female   Age: 79 y.o. Ethnicity: Unavailable/Unknown   : 1950 Race: Keo Ugarte is here for Medicare AWV    Screenings for behavioral, psychosocial and functional/safety risks, and cognitive dysfunction are all negative except as indicated below. These results, as well as other patient data from the [address] form, are documented in Flowsheets linked to this Encounter. No Known Allergies    Prior to Visit Medications    Medication Sig Taking?  Authorizing Provider   atorvastatin (LIPITOR) 10 MG tablet TAKE 1 TABLET DAILY Yes Kathy Li DO   KLOR-CON M20 20 MEQ extended release tablet TAKE 1 TABLET DAILY Yes Kathy Li DO   dicyclomine (BENTYL) 10 MG capsule Take 1 capsule by mouth 4 times daily As needed for abdominal pain Yes Kathy Li DO   vitamin D (ERGOCALCIFEROL) 1.25 MG (55818 UT) CAPS capsule TAKE 1 CAPSULE EVERY TWO WEEKS Yes Kathy Li DO   hydroCHLOROthiazide (HYDRODIURIL) 25 MG tablet TAKE 1 TABLET DAILY Yes Kathy Li DO   topiramate (TOPAMAX) 50 MG tablet Take 1 tablet by mouth daily Yes Kathy Li DO   DULoxetine (CYMBALTA) 60 MG extended release capsule Take 1 capsule by mouth daily Yes Kathy Li DO   CALTRATE 600+D PLUS MINERALS (CALTRATE) 600-800 MG-UNIT TABS tablet Take 1 tablet by mouth 2 times daily Yes Historical Provider, MD   RA COL-RITE 100 MG capsule Take 1 capsule by mouth daily as needed Yes Historical Provider, MD   Multiple Vitamins-Minerals (PRESERVISION AREDS 2+MULTI VIT PO) Take by mouth Yes Historical Provider, MD   Coenzyme Q10 200 MG CAPS Take 100 mg by mouth daily  Yes Historical Provider, MD   COATED ASPIRIN PO Take 1 tablet by mouth 2 times daily  Yes Historical Provider, MD   amLODIPine (NORVASC) 5 MG tablet Take 1 tablet by mouth daily  Rosalinda Reardon DO       Past Medical History:   Diagnosis Date    Chronic back pain     Depression     Headache(784.0)     Hyperlipidemia     Osteoarthritis     TIA (transient ischemic attack)        Past Surgical History:   Procedure Laterality Date    HYSTERECTOMY      JOINT REPLACEMENT  2007    Knee surgery    SPINE SURGERY  2001    neck surgery with Dr Erika Schroeder       Family History   Problem Relation Age of Onset    Diabetes Mother        CareTeam (Including outside providers/suppliers regularly involved in providing care):   Patient Care Team:  Winona List, DO as PCP - General (Internal Medicine)  Winona List, DO as PCP - [facility] Empaneled Provider    Wt Readings from Last 3 Encounters:   10/26/20 174 lb (78.9 kg)   09/16/20 174 lb (78.9 kg)   06/16/20 169 lb (76.7 kg)     Vitals:    10/26/20 0729   BP: 115/80   Pulse: 87   Temp: 97.8 °F (36.6 °C)   TempSrc: Temporal   SpO2: 97%   Weight: 174 lb (78.9 kg)     Body mass index is 27.25 kg/m². Based upon direct observation of the patient, evaluation of cognition reveals recent and remote memory intact. Patient's complete Health Risk Assessment and screening values have been reviewed and are found in Flowsheets. The following problems were reviewed today and where indicated follow up appointments were made and/or referrals ordered. Positive Risk Factor Screenings with Interventions:     General Health and ACP:  General  In general, how would you say your health is?: Good  In the past 7 days, have you experienced any of the following?  New or Increased Pain, New or Increased Fatigue, Loneliness, Social Isolation, Stress or Anger?: (!) New or Increased Fatigue, Loneliness, Social Isolation, Stress  Do you get the social and emotional support that you need?: (!) No  Do you have a Living Will?: Yes  Advance Directives     Power of  Living Will ACP-Advance Directive ACP-Power of     Not on File Not on File [place]      General Health Risk Interventions:  · Loneliness: patient declines any

## 2020-11-30 RX ORDER — DULOXETIN HYDROCHLORIDE 60 MG/1
CAPSULE, DELAYED RELEASE ORAL
Qty: 90 CAPSULE | Refills: 3 | Status: SHIPPED
Start: 2020-11-30 | End: 2021-11-02

## 2020-12-14 ENCOUNTER — OFFICE VISIT (OUTPATIENT)
Dept: PRIMARY CARE CLINIC | Age: 70
End: 2020-12-14
Payer: MEDICARE

## 2020-12-14 VITALS
HEIGHT: 67 IN | BODY MASS INDEX: 27.31 KG/M2 | HEART RATE: 78 BPM | WEIGHT: 174 LBS | TEMPERATURE: 97.2 F | SYSTOLIC BLOOD PRESSURE: 116 MMHG | OXYGEN SATURATION: 98 % | DIASTOLIC BLOOD PRESSURE: 74 MMHG

## 2020-12-14 DIAGNOSIS — I10 ESSENTIAL HYPERTENSION: ICD-10-CM

## 2020-12-14 DIAGNOSIS — E78.2 MIXED HYPERLIPIDEMIA: ICD-10-CM

## 2020-12-14 PROBLEM — Z23 NEED FOR VACCINATION WITH PVAX (PNEUMOCOCCAL VACCINATION): Status: ACTIVE | Noted: 2020-12-14

## 2020-12-14 PROBLEM — M54.16 LUMBAR RADICULOPATHY: Status: ACTIVE | Noted: 2020-12-14

## 2020-12-14 LAB
ALBUMIN SERPL-MCNC: 4.2 G/DL (ref 3.5–5.2)
ALP BLD-CCNC: 76 U/L (ref 35–104)
ALT SERPL-CCNC: 20 U/L (ref 0–32)
ANION GAP SERPL CALCULATED.3IONS-SCNC: 13 MMOL/L (ref 7–16)
AST SERPL-CCNC: 28 U/L (ref 0–31)
BILIRUB SERPL-MCNC: 0.3 MG/DL (ref 0–1.2)
BUN BLDV-MCNC: 24 MG/DL (ref 8–23)
CALCIUM SERPL-MCNC: 9.7 MG/DL (ref 8.6–10.2)
CHLORIDE BLD-SCNC: 107 MMOL/L (ref 98–107)
CHOLESTEROL, TOTAL: 168 MG/DL (ref 0–199)
CO2: 21 MMOL/L (ref 22–29)
CREAT SERPL-MCNC: 0.7 MG/DL (ref 0.5–1)
GFR AFRICAN AMERICAN: >60
GFR NON-AFRICAN AMERICAN: >60 ML/MIN/1.73
GLUCOSE BLD-MCNC: 104 MG/DL (ref 74–99)
HDLC SERPL-MCNC: 50 MG/DL
LDL CHOLESTEROL CALCULATED: 93 MG/DL (ref 0–99)
POTASSIUM SERPL-SCNC: 3.6 MMOL/L (ref 3.5–5)
SODIUM BLD-SCNC: 141 MMOL/L (ref 132–146)
TOTAL PROTEIN: 7 G/DL (ref 6.4–8.3)
TRIGL SERPL-MCNC: 123 MG/DL (ref 0–149)
VLDLC SERPL CALC-MCNC: 25 MG/DL

## 2020-12-14 PROCEDURE — 1036F TOBACCO NON-USER: CPT | Performed by: INTERNAL MEDICINE

## 2020-12-14 PROCEDURE — G9899 SCRN MAM PERF RSLTS DOC: HCPCS | Performed by: INTERNAL MEDICINE

## 2020-12-14 PROCEDURE — 4040F PNEUMOC VAC/ADMIN/RCVD: CPT | Performed by: INTERNAL MEDICINE

## 2020-12-14 PROCEDURE — 90732 PPSV23 VACC 2 YRS+ SUBQ/IM: CPT | Performed by: INTERNAL MEDICINE

## 2020-12-14 PROCEDURE — G8484 FLU IMMUNIZE NO ADMIN: HCPCS | Performed by: INTERNAL MEDICINE

## 2020-12-14 PROCEDURE — 99214 OFFICE O/P EST MOD 30 MIN: CPT | Performed by: INTERNAL MEDICINE

## 2020-12-14 PROCEDURE — G8399 PT W/DXA RESULTS DOCUMENT: HCPCS | Performed by: INTERNAL MEDICINE

## 2020-12-14 PROCEDURE — 1123F ACP DISCUSS/DSCN MKR DOCD: CPT | Performed by: INTERNAL MEDICINE

## 2020-12-14 PROCEDURE — G8417 CALC BMI ABV UP PARAM F/U: HCPCS | Performed by: INTERNAL MEDICINE

## 2020-12-14 PROCEDURE — G0009 ADMIN PNEUMOCOCCAL VACCINE: HCPCS | Performed by: INTERNAL MEDICINE

## 2020-12-14 PROCEDURE — 1090F PRES/ABSN URINE INCON ASSESS: CPT | Performed by: INTERNAL MEDICINE

## 2020-12-14 PROCEDURE — 3017F COLORECTAL CA SCREEN DOC REV: CPT | Performed by: INTERNAL MEDICINE

## 2020-12-14 PROCEDURE — G8427 DOCREV CUR MEDS BY ELIG CLIN: HCPCS | Performed by: INTERNAL MEDICINE

## 2020-12-14 ASSESSMENT — ENCOUNTER SYMPTOMS
PHOTOPHOBIA: 0
COUGH: 0
RHINORRHEA: 0
SORE THROAT: 0
ANAL BLEEDING: 0
WHEEZING: 0
EYE ITCHING: 0
ABDOMINAL PAIN: 0
DIARRHEA: 0
TROUBLE SWALLOWING: 0
BLOOD IN STOOL: 0
COLOR CHANGE: 0
VOMITING: 0
STRIDOR: 0
FACIAL SWELLING: 0
SHORTNESS OF BREATH: 0
CONSTIPATION: 1
EYE PAIN: 0
EYE DISCHARGE: 0
NAUSEA: 0

## 2020-12-14 NOTE — ASSESSMENT & PLAN NOTE
Continue duloxetine as it is working well for her.   This should also help with radiculopathy symptoms

## 2020-12-14 NOTE — ASSESSMENT & PLAN NOTE
Refer to Dr. Jaja Goodwin for further evaluation and treatment.   Consideration toward starting gabapentin and increasing doses

## 2020-12-14 NOTE — PROGRESS NOTES
2020    Name: Kelle Shoulder : 1950 Sex: female  Age: 79 y.o. Subjective:  Chief Complaint   Patient presents with    Hypertension        Hyperlipidemia  Pertinent negatives include no chest pain, myalgias or shortness of breath. Hypertension  Pertinent negatives include no chest pain, headaches, palpitations or shortness of breath. She saw Dr. Benjiman Councilman. She was complaining of pain around her right shin area numbness and tingling of her toes also numbness in her right lower extremity below the knee. Dr. Benjiman Councilman reassured her that it had nothing to do with her recent knee surgery. About a year ago she had a lumbar laminectomy at the [facility] and I wonder if she is developing some radiculopathy. She saw Dr. Emily Rivera in the past and would like to see him again. .    She has a history of hypertension, hyperlipidemia, knee arthritis, urinary frequency, and depression. Pressures at home are very good since adjusting her medications. She is on Cymbalta 60 mg a day and does well on this. She has no symptoms of anhedonia, hypersomnolence or suicidal or homicidal ideations. She had her knee surgery earlier this year and  had an InterStim implant with her urologist.  She no longer has urgency and urinary frequency. She cannot have any dental procedures until a few months of past and she will need antibiotic prophylaxis for this. ..    She takes topiramate for prophylaxis against migraines since she has had no migraine headaches. She complains of constipation. She does not take MiraLAX and I recommended that she start MiraLAX. Colonoscopy done by Dr Daniel Woo. in 2020 showed diverticuli and tortuous colon with spasms. Her sister has severe osteoporosis . Patient had a DEXA scan which showed bone density  lowest T score of -1.2 in her hip.   She continues on vitamin D3 and calcium from food sources    She had her mammogram in 2020 and this showed no mammographic evidence of malignancy. She found a lump on her left anterior hip. She says the left side of her body is larger than the right side. 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 She does not want any surgery at this time. Reviewed blood work from June 2020 and her CMP and fasting lipid profile were excellent. Except for LDL of 101. Review of Systems   Constitutional: Negative for appetite change, fatigue and unexpected weight change. HENT: Negative for congestion, ear pain, facial swelling, postnasal drip, rhinorrhea, sore throat, tinnitus and trouble swallowing. Eyes: Negative for photophobia, pain, discharge, itching and visual disturbance. Respiratory: Negative for cough, shortness of breath, wheezing and stridor. Cardiovascular: Negative for chest pain, palpitations and leg swelling. Gastrointestinal: Positive for constipation. Negative for abdominal pain, anal bleeding, blood in stool, diarrhea, nausea and vomiting. Endocrine: Negative for cold intolerance, heat intolerance, polydipsia, polyphagia and polyuria. Genitourinary: Negative for difficulty urinating, dysuria, flank pain, frequency, hematuria and urgency. Musculoskeletal: Negative for arthralgias, gait problem, joint swelling and myalgias. See hpi   Skin: Negative for color change, pallor and rash. Allergic/Immunologic: Negative for environmental allergies and food allergies. Neurological: Positive for numbness. Negative for dizziness, tremors, seizures, syncope, speech difficulty, weakness, light-headedness and headaches. See hpi   Hematological: Negative for adenopathy. Does not bruise/bleed easily. Psychiatric/Behavioral: Negative for agitation, behavioral problems, confusion, sleep disturbance and suicidal ideas. The patient is nervous/anxious.            Current Outpatient Medications:     DULoxetine (CYMBALTA) 60 MG extended release capsule, TAKE 1 CAPSULE DAILY, Disp: 90 capsule, Rfl: 3    atorvastatin (LIPITOR) 10 MG tablet, TAKE 1 TABLET DAILY, Disp: 90 tablet, Rfl: 3    KLOR-CON M20 20 MEQ extended release tablet, TAKE 1 TABLET DAILY, Disp: 90 tablet, Rfl: 3    dicyclomine (BENTYL) 10 MG capsule, Take 1 capsule by mouth 4 times daily As needed for abdominal pain, Disp: 40 capsule, Rfl: 0    vitamin D (ERGOCALCIFEROL) 1.25 MG (06591 UT) CAPS capsule, TAKE 1 CAPSULE EVERY TWO WEEKS, Disp: 6 capsule, Rfl: 3    hydroCHLOROthiazide (HYDRODIURIL) 25 MG tablet, TAKE 1 TABLET DAILY, Disp: 90 tablet, Rfl: 3    topiramate (TOPAMAX) 50 MG tablet, Take 1 tablet by mouth daily, Disp: 180 tablet, Rfl: 1    CALTRATE 600+D PLUS MINERALS (CALTRATE) 600-800 MG-UNIT TABS tablet, Take 1 tablet by mouth 2 times daily, Disp: , Rfl:     RA COL-RITE 100 MG capsule, Take 1 capsule by mouth daily as needed, Disp: , Rfl: 0    Multiple Vitamins-Minerals (PRESERVISION AREDS 2+MULTI VIT PO), Take by mouth, Disp: , Rfl:     Coenzyme Q10 200 MG CAPS, Take 100 mg by mouth daily , Disp: , Rfl:     COATED ASPIRIN PO, Take 1 tablet by mouth 2 times daily , Disp: , Rfl:     amLODIPine (NORVASC) 5 MG tablet, Take 1 tablet by mouth daily, Disp: 90 tablet, Rfl: 5     No Known Allergies     Past Medical History:   Diagnosis Date    Chronic back pain     Depression     Headache(784.0)     Hyperlipidemia     Osteoarthritis     TIA (transient ischemic attack)        Health Maintenance Due   Topic Date Due    Hepatitis C screen  1950        Patient Active Problem List   Diagnosis    Cervical spondylosis without myelopathy    TIA (transient ischemic attack)    Essential hypertension    Knee arthropathy    Other chest pain    Depression    Hyperlipidemia    Drug-induced constipation    Weight loss    Frequency of urination    Non-seasonal allergic rhinitis due to pollen    Migraine without aura and without status migrainosus, not intractable    Menopause    Mass of left hip region    Pain of right hip joint    Lumbar radiculopathy    Need for vaccination with PVAX (pneumococcal vaccination)        Past Surgical History:   Procedure Laterality Date    HYSTERECTOMY      JOINT REPLACEMENT  01/01/2007    Knee surgery    LUMBAR LAMINECTOMY Right 2019    SPINE SURGERY  01/01/2001    neck surgery with Dr Edgar Maldonado        Family History   Problem Relation Age of Onset    Diabetes Mother         Social History     Tobacco Use    Smoking status: Never Smoker    Smokeless tobacco: Never Used   Substance Use Topics    Alcohol use: No    Drug use: No        Objective  Vitals:    12/14/20 0729   BP: 116/74   Site: Right Upper Arm   Position: Sitting   Cuff Size: Medium Adult   Pulse: 78   Temp: 97.2 °F (36.2 °C)   TempSrc: Temporal   SpO2: 98%   Weight: 174 lb (78.9 kg)   Height: 5' 7\" (1.702 m)        Exam:  Physical Exam  Constitutional:       Appearance: She is well-developed. HENT:      Head: Normocephalic. Right Ear: External ear normal.      Left Ear: External ear normal.      Nose: Nose normal.      Mouth/Throat:      Pharynx: No oropharyngeal exudate. Eyes:      General: No scleral icterus. Right eye: No discharge. Left eye: No discharge. Conjunctiva/sclera: Conjunctivae normal.      Pupils: Pupils are equal, round, and reactive to light. Neck:      Musculoskeletal: Normal range of motion and neck supple. Thyroid: No thyromegaly. Cardiovascular:      Rate and Rhythm: Normal rate and regular rhythm. Heart sounds: Normal heart sounds. No murmur. No friction rub. No gallop. Pulmonary:      Effort: Pulmonary effort is normal. No respiratory distress. Breath sounds: Normal breath sounds. No wheezing or rales. Chest:      Chest wall: No tenderness. Abdominal:      General: Bowel sounds are normal. There is no distension. Palpations: Abdomen is soft. There is no mass. Tenderness: There is no abdominal tenderness. There is no guarding or rebound.    Musculoskeletal: Normal range of motion. General: No tenderness or deformity. Comments: Bunion right great toe  She has an approximately 3 x 5 cm mass on her left anterior hip that feels like a lipoma. Tenderness to palpation right hip. Lymphadenopathy:      Cervical: No cervical adenopathy. Skin:     General: Skin is warm and dry. Coloration: Skin is not pale. Findings: No erythema or rash. Neurological:      Mental Status: She is alert and oriented to person, place, and time. Cranial Nerves: No cranial nerve deficit. Sensory: No sensory deficit. Deep Tendon Reflexes: Reflexes normal.      Comments: Numbness on examination of her pretibial area. She has a tender area mid point. Numbness and tingling of her toes. Deep tendon reflexes slightly decreased knee jerk on the right compared to the left. Sensation to light touch decreased on the right anterior lower extremity compared to the left. No muscle weakness of her lower extremities and feet   Psychiatric:         Mood and Affect: Mood normal.         Behavior: Behavior normal.         Thought Content: Thought content normal.         Judgment: Judgment normal.          Last labs reviewed. ASSESSMENT & PLAN :   Problem List        Circulatory    Essential hypertension - Primary     Blood pressures are stable. Continue medications and monitor blood pressures at home. Call office if systolics are over 176 over diastolics over 90. Relevant Orders    Comprehensive Metabolic Panel       Nervous and Auditory    Lumbar radiculopathy     Refer to Dr. Prince Lu for further evaluation and treatment.   Consideration toward starting gabapentin and increasing doses         Relevant Medications    topiramate (TOPAMAX) 50 MG tablet    DULoxetine (CYMBALTA) 60 MG extended release capsule    Other Relevant Orders    Serge Richardson MD, Physical Medicine and [facility], [place] ([facility])       Other    Depression     Continue duloxetine as it is working well for her. This should also help with radiculopathy symptoms         Relevant Medications    DULoxetine (CYMBALTA) 60 MG extended release capsule    Hyperlipidemia     Watch saturated fats in diet and will monitor lipids         Relevant Medications    COATED ASPIRIN PO    hydroCHLOROthiazide (HYDRODIURIL) 25 MG tablet    atorvastatin (LIPITOR) 10 MG tablet    Other Relevant Orders    Lipid Panel    Frequency of urination     Continue with urological follow-up for InterStim placement         Need for vaccination with PVAX (pneumococcal vaccination)     Booster Pneumovax given         Relevant Orders    Pneumococcal polysaccharide vaccine 23-valent greater than or equal to 1yo subcutaneous/IM (Completed)           Return in about 3 months (around 3/14/2021).        [address] [place] [address],   12/14/2020

## 2020-12-14 NOTE — ASSESSMENT & PLAN NOTE
Blood pressures are stable. Continue medications and monitor blood pressures at home. Call office if systolics are over 809 over diastolics over 90.

## 2021-01-31 DIAGNOSIS — G43.009 MIGRAINE WITHOUT AURA AND WITHOUT STATUS MIGRAINOSUS, NOT INTRACTABLE: ICD-10-CM

## 2021-02-01 RX ORDER — TOPIRAMATE 50 MG/1
TABLET, FILM COATED ORAL
Qty: 180 TABLET | Refills: 3 | Status: SHIPPED | OUTPATIENT
Start: 2021-02-01

## 2021-03-15 ENCOUNTER — OFFICE VISIT (OUTPATIENT)
Dept: PRIMARY CARE CLINIC | Age: 71
End: 2021-03-15
Payer: MEDICARE

## 2021-03-15 VITALS
HEIGHT: 67 IN | HEART RATE: 87 BPM | SYSTOLIC BLOOD PRESSURE: 138 MMHG | BODY MASS INDEX: 28.56 KG/M2 | OXYGEN SATURATION: 97 % | WEIGHT: 182 LBS | TEMPERATURE: 97.6 F | DIASTOLIC BLOOD PRESSURE: 88 MMHG

## 2021-03-15 DIAGNOSIS — F32.89 OTHER DEPRESSION: ICD-10-CM

## 2021-03-15 DIAGNOSIS — R73.9 HYPERGLYCEMIA: ICD-10-CM

## 2021-03-15 DIAGNOSIS — R07.89 OTHER CHEST PAIN: ICD-10-CM

## 2021-03-15 DIAGNOSIS — E78.2 MIXED HYPERLIPIDEMIA: ICD-10-CM

## 2021-03-15 DIAGNOSIS — I10 ESSENTIAL HYPERTENSION: ICD-10-CM

## 2021-03-15 DIAGNOSIS — K59.03 DRUG-INDUCED CONSTIPATION: ICD-10-CM

## 2021-03-15 DIAGNOSIS — M17.10 KNEE ARTHROPATHY: ICD-10-CM

## 2021-03-15 DIAGNOSIS — I10 ESSENTIAL HYPERTENSION: Primary | ICD-10-CM

## 2021-03-15 LAB
ALBUMIN SERPL-MCNC: 4 G/DL (ref 3.5–5.2)
ALP BLD-CCNC: 67 U/L (ref 35–104)
ALT SERPL-CCNC: 19 U/L (ref 0–32)
ANION GAP SERPL CALCULATED.3IONS-SCNC: 10 MMOL/L (ref 7–16)
AST SERPL-CCNC: 31 U/L (ref 0–31)
BASOPHILS ABSOLUTE: 0.36 E9/L (ref 0–0.2)
BASOPHILS RELATIVE PERCENT: 3 % (ref 0–2)
BILIRUB SERPL-MCNC: 0.2 MG/DL (ref 0–1.2)
BUN BLDV-MCNC: 15 MG/DL (ref 8–23)
CALCIUM SERPL-MCNC: 9.3 MG/DL (ref 8.6–10.2)
CHLORIDE BLD-SCNC: 106 MMOL/L (ref 98–107)
CHOLESTEROL, TOTAL: 171 MG/DL (ref 0–199)
CO2: 27 MMOL/L (ref 22–29)
CREAT SERPL-MCNC: 0.6 MG/DL (ref 0.5–1)
EOSINOPHILS ABSOLUTE: 1.03 E9/L (ref 0.05–0.5)
EOSINOPHILS RELATIVE PERCENT: 8.5 % (ref 0–6)
GFR AFRICAN AMERICAN: >60
GFR NON-AFRICAN AMERICAN: >60 ML/MIN/1.73
GLUCOSE BLD-MCNC: 95 MG/DL (ref 74–99)
HBA1C MFR BLD: 5.7 % (ref 4–5.6)
HCT VFR BLD CALC: 49.2 % (ref 34–48)
HDLC SERPL-MCNC: 43 MG/DL
HEMOGLOBIN: 14.9 G/DL (ref 11.5–15.5)
IMMATURE GRANULOCYTES #: 0.05 E9/L
IMMATURE GRANULOCYTES %: 0.4 % (ref 0–5)
LDL CHOLESTEROL CALCULATED: 92 MG/DL (ref 0–99)
LYMPHOCYTES ABSOLUTE: 3.38 E9/L (ref 1.5–4)
LYMPHOCYTES RELATIVE PERCENT: 27.9 % (ref 20–42)
MCH RBC QN AUTO: 29.7 PG (ref 26–35)
MCHC RBC AUTO-ENTMCNC: 30.3 % (ref 32–34.5)
MCV RBC AUTO: 98.2 FL (ref 80–99.9)
MONOCYTES ABSOLUTE: 1.15 E9/L (ref 0.1–0.95)
MONOCYTES RELATIVE PERCENT: 9.5 % (ref 2–12)
NEUTROPHILS ABSOLUTE: 6.14 E9/L (ref 1.8–7.3)
NEUTROPHILS RELATIVE PERCENT: 50.7 % (ref 43–80)
PDW BLD-RTO: 14.8 FL (ref 11.5–15)
PLATELET # BLD: 480 E9/L (ref 130–450)
PMV BLD AUTO: 10.5 FL (ref 7–12)
POTASSIUM SERPL-SCNC: 4.1 MMOL/L (ref 3.5–5)
RBC # BLD: 5.01 E12/L (ref 3.5–5.5)
SODIUM BLD-SCNC: 143 MMOL/L (ref 132–146)
TOTAL PROTEIN: 6.5 G/DL (ref 6.4–8.3)
TRIGL SERPL-MCNC: 180 MG/DL (ref 0–149)
VLDLC SERPL CALC-MCNC: 36 MG/DL
WBC # BLD: 12.1 E9/L (ref 4.5–11.5)

## 2021-03-15 PROCEDURE — 1090F PRES/ABSN URINE INCON ASSESS: CPT | Performed by: INTERNAL MEDICINE

## 2021-03-15 PROCEDURE — 3017F COLORECTAL CA SCREEN DOC REV: CPT | Performed by: INTERNAL MEDICINE

## 2021-03-15 PROCEDURE — 99214 OFFICE O/P EST MOD 30 MIN: CPT | Performed by: INTERNAL MEDICINE

## 2021-03-15 PROCEDURE — G8417 CALC BMI ABV UP PARAM F/U: HCPCS | Performed by: INTERNAL MEDICINE

## 2021-03-15 PROCEDURE — G8484 FLU IMMUNIZE NO ADMIN: HCPCS | Performed by: INTERNAL MEDICINE

## 2021-03-15 PROCEDURE — G8427 DOCREV CUR MEDS BY ELIG CLIN: HCPCS | Performed by: INTERNAL MEDICINE

## 2021-03-15 PROCEDURE — 1036F TOBACCO NON-USER: CPT | Performed by: INTERNAL MEDICINE

## 2021-03-15 PROCEDURE — 1123F ACP DISCUSS/DSCN MKR DOCD: CPT | Performed by: INTERNAL MEDICINE

## 2021-03-15 PROCEDURE — 4040F PNEUMOC VAC/ADMIN/RCVD: CPT | Performed by: INTERNAL MEDICINE

## 2021-03-15 PROCEDURE — G8399 PT W/DXA RESULTS DOCUMENT: HCPCS | Performed by: INTERNAL MEDICINE

## 2021-03-15 ASSESSMENT — ENCOUNTER SYMPTOMS
SHORTNESS OF BREATH: 0
PHOTOPHOBIA: 0
EYE DISCHARGE: 0
VOMITING: 0
RHINORRHEA: 0
COLOR CHANGE: 0
EYE PAIN: 0
ANAL BLEEDING: 0
EYE ITCHING: 0
BLOOD IN STOOL: 0
CONSTIPATION: 1
COUGH: 0
DIARRHEA: 0
NAUSEA: 0
TROUBLE SWALLOWING: 0
STRIDOR: 0
SORE THROAT: 0
WHEEZING: 0
ABDOMINAL PAIN: 0
FACIAL SWELLING: 0

## 2021-03-15 NOTE — PROGRESS NOTES
from December 2020. Fasting blood sugar was 104. Lipids were good. She will be moving sometime in the future to [place] to be with her daughter. She is anxious about this but she is also excited about it. .    Review of Systems   Constitutional: Negative for appetite change, fatigue and unexpected weight change. HENT: Negative for congestion, ear pain, facial swelling, postnasal drip, rhinorrhea, sore throat, tinnitus and trouble swallowing. Eyes: Negative for photophobia, pain, discharge, itching and visual disturbance. Respiratory: Negative for cough, shortness of breath, wheezing and stridor. Cardiovascular: Negative for chest pain, palpitations and leg swelling. Gastrointestinal: Positive for constipation. Negative for abdominal pain, anal bleeding, blood in stool, diarrhea, nausea and vomiting. Endocrine: Negative for cold intolerance, heat intolerance, polydipsia, polyphagia and polyuria. Genitourinary: Negative for difficulty urinating, dysuria, flank pain, frequency, hematuria and urgency. Musculoskeletal: Negative for arthralgias, gait problem, joint swelling and myalgias. See hpi   Skin: Negative for color change, pallor and rash. No palpable lump or mass under her left breast.   Allergic/Immunologic: Negative for environmental allergies and food allergies. Neurological: Positive for numbness. Negative for dizziness, tremors, seizures, syncope, speech difficulty, weakness, light-headedness and headaches. See hpi   Hematological: Negative for adenopathy. Does not bruise/bleed easily. Psychiatric/Behavioral: Negative for agitation, behavioral problems, confusion, sleep disturbance and suicidal ideas. The patient is nervous/anxious.            Current Outpatient Medications:     topiramate (TOPAMAX) 50 MG tablet, TAKE 1 TABLET DAILY, Disp: 180 tablet, Rfl: 3    DULoxetine (CYMBALTA) 60 MG extended release capsule, TAKE 1 CAPSULE DAILY, Disp: 90 capsule, Rfl: 3    atorvastatin (LIPITOR) 10 MG tablet, TAKE 1 TABLET DAILY, Disp: 90 tablet, Rfl: 3    KLOR-CON M20 20 MEQ extended release tablet, TAKE 1 TABLET DAILY, Disp: 90 tablet, Rfl: 3    dicyclomine (BENTYL) 10 MG capsule, Take 1 capsule by mouth 4 times daily As needed for abdominal pain, Disp: 40 capsule, Rfl: 0    vitamin D (ERGOCALCIFEROL) 1.25 MG (87216 UT) CAPS capsule, TAKE 1 CAPSULE EVERY TWO WEEKS, Disp: 6 capsule, Rfl: 3    hydroCHLOROthiazide (HYDRODIURIL) 25 MG tablet, TAKE 1 TABLET DAILY, Disp: 90 tablet, Rfl: 3    CALTRATE 600+D PLUS MINERALS (CALTRATE) 600-800 MG-UNIT TABS tablet, Take 1 tablet by mouth 2 times daily, Disp: , Rfl:     RA COL-RITE 100 MG capsule, Take 1 capsule by mouth daily as needed, Disp: , Rfl: 0    Multiple Vitamins-Minerals (PRESERVISION AREDS 2+MULTI VIT PO), Take by mouth, Disp: , Rfl:     Coenzyme Q10 200 MG CAPS, Take 100 mg by mouth daily , Disp: , Rfl:     COATED ASPIRIN PO, Take 1 tablet by mouth 2 times daily , Disp: , Rfl:     amLODIPine (NORVASC) 5 MG tablet, Take 1 tablet by mouth daily, Disp: 90 tablet, Rfl: 5     No Known Allergies     Past Medical History:   Diagnosis Date    Chronic back pain     Depression     Headache(784.0)     Hyperlipidemia     Osteoarthritis     TIA (transient ischemic attack)        Health Maintenance Due   Topic Date Due    Hepatitis C screen  Never done    COVID-19 Vaccine (1) Never done    Diabetes screen  Never done        Patient Active Problem List   Diagnosis    Cervical spondylosis without myelopathy    TIA (transient ischemic attack)    Essential hypertension    Knee arthropathy    Other chest pain    Depression    Hyperlipidemia    Drug-induced constipation    Weight loss    Frequency of urination    Non-seasonal allergic rhinitis due to pollen    Migraine without aura and without status migrainosus, not intractable    Menopause    Mass of left hip region    Pain of right hip joint    Lumbar radiculopathy    Need for vaccination with PVAX (pneumococcal vaccination)    Hyperglycemia        Past Surgical History:   Procedure Laterality Date    HYSTERECTOMY      JOINT REPLACEMENT  01/01/2007    Knee surgery    LUMBAR LAMINECTOMY Right 2019    SPINE SURGERY  01/01/2001    neck surgery with Dr Andrew Shelton        Family History   Problem Relation Age of Onset    Diabetes Mother         Social History     Tobacco Use    Smoking status: Never Smoker    Smokeless tobacco: Never Used   Substance Use Topics    Alcohol use: No    Drug use: No        Objective  Vitals:    03/15/21 0730   BP: 138/88   Site: Right Upper Arm   Position: Sitting   Cuff Size: Medium Adult   Pulse: 87   Temp: 97.6 °F (36.4 °C)   TempSrc: Temporal   SpO2: 97%   Weight: 182 lb (82.6 kg)   Height: 5' 7\" (1.702 m)        Exam:  Physical Exam  Constitutional:       Appearance: She is well-developed. HENT:      Head: Normocephalic. Right Ear: External ear normal.      Left Ear: External ear normal.      Nose: Nose normal.      Mouth/Throat:      Pharynx: No oropharyngeal exudate. Eyes:      General: No scleral icterus. Right eye: No discharge. Left eye: No discharge. Conjunctiva/sclera: Conjunctivae normal.      Pupils: Pupils are equal, round, and reactive to light. Neck:      Musculoskeletal: Normal range of motion and neck supple. Thyroid: No thyromegaly. Cardiovascular:      Rate and Rhythm: Normal rate and regular rhythm. Heart sounds: Normal heart sounds. No murmur. No friction rub. No gallop. Pulmonary:      Effort: Pulmonary effort is normal. No respiratory distress. Breath sounds: Normal breath sounds. No wheezing or rales. Chest:      Chest wall: No tenderness. Abdominal:      General: Bowel sounds are normal. There is no distension. Palpations: Abdomen is soft. There is no mass. Tenderness: There is no abdominal tenderness. There is no guarding or rebound. Musculoskeletal: Normal range of motion. General: No tenderness or deformity. Comments: Bunion right great toe  She has an approximately 3 x 5 cm mass on her left anterior hip that feels like a lipoma. Tenderness to palpation right hip. Lymphadenopathy:      Cervical: No cervical adenopathy. Skin:     General: Skin is warm and dry. Coloration: Skin is not pale. Findings: No erythema or rash. Neurological:      Mental Status: She is alert and oriented to person, place, and time. Cranial Nerves: No cranial nerve deficit. Sensory: No sensory deficit. Deep Tendon Reflexes: Reflexes normal.   Psychiatric:         Mood and Affect: Mood normal.         Behavior: Behavior normal.         Thought Content: Thought content normal.         Judgment: Judgment normal.          Last labs reviewed. ASSESSMENT & PLAN :   Problem List        Circulatory    Essential hypertension - Primary     Blood pressures are stable. Continue medications and monitor blood pressures at home. Call office if systolics are over 261 over diastolics over 90. Check fasting CMP         Relevant Orders    Comprehensive Metabolic Panel       Digestive    Drug-induced constipation     Stay on MiraLAX and increase fluids and exercise            Musculoskeletal and Integument    Knee arthropathy     Improved            Other    Other chest pain     More chest wall pain underneath her left breast.  Probably secondary to underwire bras. Keep an eye on it and if the mass develops we will image it.   Otherwise to stop wearing her underwire bras and see how it does         Depression     Continue duloxetine, check CBC and CMP         Relevant Medications    DULoxetine (CYMBALTA) 60 MG extended release capsule    Other Relevant Orders    Comprehensive Metabolic Panel    CBC Auto Differential    Hyperlipidemia     Watch saturated fats in diet and will monitor lipids continue atorvastatin 10 mg a day         Relevant Medications    COATED ASPIRIN PO    hydroCHLOROthiazide (HYDRODIURIL) 25 MG tablet    atorvastatin (LIPITOR) 10 MG tablet    Other Relevant Orders    Lipid Panel    Hyperglycemia     Check fasting blood sugar and hemoglobin A1c         Relevant Orders    Hemoglobin A1C           Return in about 3 months (around 6/15/2021), or htn, hyperlipidemia.        Harinder Morillo,   3/15/2021

## 2021-03-15 NOTE — ASSESSMENT & PLAN NOTE
Blood pressures are stable. Continue medications and monitor blood pressures at home. Call office if systolics are over 386 over diastolics over 90.   Check fasting CMP

## 2021-03-15 NOTE — ASSESSMENT & PLAN NOTE
More chest wall pain underneath her left breast.  Probably secondary to underwire bras. Keep an eye on it and if the mass develops we will image it.   Otherwise to stop wearing her underwire bras and see how it does

## 2021-03-18 ENCOUNTER — TELEPHONE (OUTPATIENT)
Dept: PRIMARY CARE CLINIC | Age: 71
End: 2021-03-18

## 2021-03-18 DIAGNOSIS — M19.91 PRIMARY OSTEOARTHRITIS, UNSPECIFIED SITE: Primary | ICD-10-CM

## 2021-04-04 DIAGNOSIS — I10 ESSENTIAL HYPERTENSION: ICD-10-CM

## 2021-04-05 RX ORDER — AMLODIPINE BESYLATE 5 MG/1
TABLET ORAL
Qty: 90 TABLET | Refills: 3 | Status: SHIPPED | OUTPATIENT
Start: 2021-04-05

## 2021-04-13 ENCOUNTER — TELEPHONE (OUTPATIENT)
Dept: PRIMARY CARE CLINIC | Age: 71
End: 2021-04-13

## 2021-04-13 NOTE — TELEPHONE ENCOUNTER
Please mail handicap placard to patient. It is on the . She is unable to pick it up because she broke her wrist and cannot drive.

## 2021-04-16 RX ORDER — HYDROCHLOROTHIAZIDE 25 MG/1
TABLET ORAL
Qty: 90 TABLET | Refills: 3 | Status: SHIPPED | OUTPATIENT
Start: 2021-04-16

## 2021-04-28 RX ORDER — ERGOCALCIFEROL 1.25 MG/1
CAPSULE ORAL
Qty: 6 CAPSULE | Refills: 3 | Status: SHIPPED | OUTPATIENT
Start: 2021-04-28

## 2021-06-17 ENCOUNTER — OFFICE VISIT (OUTPATIENT)
Dept: PRIMARY CARE CLINIC | Age: 71
End: 2021-06-17
Payer: MEDICARE

## 2021-06-17 VITALS
HEIGHT: 67 IN | SYSTOLIC BLOOD PRESSURE: 122 MMHG | BODY MASS INDEX: 27.15 KG/M2 | HEART RATE: 81 BPM | WEIGHT: 173 LBS | TEMPERATURE: 97.6 F | OXYGEN SATURATION: 98 % | DIASTOLIC BLOOD PRESSURE: 64 MMHG

## 2021-06-17 DIAGNOSIS — G43.009 MIGRAINE WITHOUT AURA AND WITHOUT STATUS MIGRAINOSUS, NOT INTRACTABLE: ICD-10-CM

## 2021-06-17 DIAGNOSIS — F32.89 OTHER DEPRESSION: ICD-10-CM

## 2021-06-17 DIAGNOSIS — Z12.31 ENCOUNTER FOR SCREENING MAMMOGRAM FOR MALIGNANT NEOPLASM OF BREAST: ICD-10-CM

## 2021-06-17 DIAGNOSIS — E78.1 PURE HYPERTRIGLYCERIDEMIA: ICD-10-CM

## 2021-06-17 DIAGNOSIS — D72.828 OTHER ELEVATED WHITE BLOOD CELL (WBC) COUNT: ICD-10-CM

## 2021-06-17 DIAGNOSIS — R73.9 HYPERGLYCEMIA: ICD-10-CM

## 2021-06-17 DIAGNOSIS — I10 ESSENTIAL HYPERTENSION: ICD-10-CM

## 2021-06-17 DIAGNOSIS — I10 ESSENTIAL HYPERTENSION: Primary | ICD-10-CM

## 2021-06-17 PROBLEM — Z86.59 HISTORY OF DEPRESSION: Status: ACTIVE | Noted: 2021-06-17

## 2021-06-17 PROBLEM — Z96.651 STATUS POST TOTAL RIGHT KNEE REPLACEMENT: Status: ACTIVE | Noted: 2021-06-17

## 2021-06-17 LAB
ALBUMIN SERPL-MCNC: 4.3 G/DL (ref 3.5–5.2)
ALP BLD-CCNC: 70 U/L (ref 35–104)
ALT SERPL-CCNC: 21 U/L (ref 0–32)
ANION GAP SERPL CALCULATED.3IONS-SCNC: 14 MMOL/L (ref 7–16)
AST SERPL-CCNC: 34 U/L (ref 0–31)
BASOPHILS ABSOLUTE: 0.42 E9/L (ref 0–0.2)
BASOPHILS RELATIVE PERCENT: 2.8 % (ref 0–2)
BILIRUB SERPL-MCNC: 0.3 MG/DL (ref 0–1.2)
BUN BLDV-MCNC: 16 MG/DL (ref 6–23)
CALCIUM SERPL-MCNC: 10.4 MG/DL (ref 8.6–10.2)
CHLORIDE BLD-SCNC: 106 MMOL/L (ref 98–107)
CHOLESTEROL, TOTAL: 186 MG/DL (ref 0–199)
CO2: 23 MMOL/L (ref 22–29)
CREAT SERPL-MCNC: 0.6 MG/DL (ref 0.5–1)
EOSINOPHILS ABSOLUTE: 1.04 E9/L (ref 0.05–0.5)
EOSINOPHILS RELATIVE PERCENT: 6.8 % (ref 0–6)
GFR AFRICAN AMERICAN: >60
GFR NON-AFRICAN AMERICAN: >60 ML/MIN/1.73
GLUCOSE BLD-MCNC: 90 MG/DL (ref 74–99)
HBA1C MFR BLD: 5.9 % (ref 4–5.6)
HCT VFR BLD CALC: 48.1 % (ref 34–48)
HDLC SERPL-MCNC: 38 MG/DL
HEMOGLOBIN: 14.8 G/DL (ref 11.5–15.5)
IMMATURE GRANULOCYTES #: 0.25 E9/L
IMMATURE GRANULOCYTES %: 1.6 % (ref 0–5)
LDL CHOLESTEROL CALCULATED: 118 MG/DL (ref 0–99)
LYMPHOCYTES ABSOLUTE: 3.52 E9/L (ref 1.5–4)
LYMPHOCYTES RELATIVE PERCENT: 23.1 % (ref 20–42)
MCH RBC QN AUTO: 29.3 PG (ref 26–35)
MCHC RBC AUTO-ENTMCNC: 30.8 % (ref 32–34.5)
MCV RBC AUTO: 95.2 FL (ref 80–99.9)
MONOCYTES ABSOLUTE: 1.33 E9/L (ref 0.1–0.95)
MONOCYTES RELATIVE PERCENT: 8.7 % (ref 2–12)
NEUTROPHILS ABSOLUTE: 8.7 E9/L (ref 1.8–7.3)
NEUTROPHILS RELATIVE PERCENT: 57 % (ref 43–80)
PDW BLD-RTO: 15 FL (ref 11.5–15)
PLATELET # BLD: 520 E9/L (ref 130–450)
PMV BLD AUTO: 10.4 FL (ref 7–12)
POTASSIUM SERPL-SCNC: 4.2 MMOL/L (ref 3.5–5)
RBC # BLD: 5.05 E12/L (ref 3.5–5.5)
SODIUM BLD-SCNC: 143 MMOL/L (ref 132–146)
TOTAL PROTEIN: 7.2 G/DL (ref 6.4–8.3)
TRIGL SERPL-MCNC: 150 MG/DL (ref 0–149)
VLDLC SERPL CALC-MCNC: 30 MG/DL
WBC # BLD: 15.3 E9/L (ref 4.5–11.5)

## 2021-06-17 PROCEDURE — 3017F COLORECTAL CA SCREEN DOC REV: CPT | Performed by: INTERNAL MEDICINE

## 2021-06-17 PROCEDURE — 1036F TOBACCO NON-USER: CPT | Performed by: INTERNAL MEDICINE

## 2021-06-17 PROCEDURE — G8427 DOCREV CUR MEDS BY ELIG CLIN: HCPCS | Performed by: INTERNAL MEDICINE

## 2021-06-17 PROCEDURE — 99214 OFFICE O/P EST MOD 30 MIN: CPT | Performed by: INTERNAL MEDICINE

## 2021-06-17 PROCEDURE — 1090F PRES/ABSN URINE INCON ASSESS: CPT | Performed by: INTERNAL MEDICINE

## 2021-06-17 PROCEDURE — G8417 CALC BMI ABV UP PARAM F/U: HCPCS | Performed by: INTERNAL MEDICINE

## 2021-06-17 PROCEDURE — G8399 PT W/DXA RESULTS DOCUMENT: HCPCS | Performed by: INTERNAL MEDICINE

## 2021-06-17 PROCEDURE — 1123F ACP DISCUSS/DSCN MKR DOCD: CPT | Performed by: INTERNAL MEDICINE

## 2021-06-17 PROCEDURE — 4040F PNEUMOC VAC/ADMIN/RCVD: CPT | Performed by: INTERNAL MEDICINE

## 2021-06-17 SDOH — ECONOMIC STABILITY: FOOD INSECURITY: WITHIN THE PAST 12 MONTHS, THE FOOD YOU BOUGHT JUST DIDN'T LAST AND YOU DIDN'T HAVE MONEY TO GET MORE.: NEVER TRUE

## 2021-06-17 SDOH — ECONOMIC STABILITY: FOOD INSECURITY: WITHIN THE PAST 12 MONTHS, YOU WORRIED THAT YOUR FOOD WOULD RUN OUT BEFORE YOU GOT MONEY TO BUY MORE.: NEVER TRUE

## 2021-06-17 ASSESSMENT — ENCOUNTER SYMPTOMS
PHOTOPHOBIA: 0
VOMITING: 0
SHORTNESS OF BREATH: 0
ABDOMINAL PAIN: 0
COLOR CHANGE: 0
BLOOD IN STOOL: 0
FACIAL SWELLING: 0
EYE DISCHARGE: 0
ANAL BLEEDING: 0
DIARRHEA: 0
SORE THROAT: 0
EYE PAIN: 0
CONSTIPATION: 1
RHINORRHEA: 0
TROUBLE SWALLOWING: 0
COUGH: 0
NAUSEA: 0
WHEEZING: 0
EYE ITCHING: 0
STRIDOR: 0

## 2021-06-17 ASSESSMENT — SOCIAL DETERMINANTS OF HEALTH (SDOH): HOW HARD IS IT FOR YOU TO PAY FOR THE VERY BASICS LIKE FOOD, HOUSING, MEDICAL CARE, AND HEATING?: NOT HARD AT ALL

## 2021-06-17 NOTE — ASSESSMENT & PLAN NOTE
Blood pressures are stable. Continue medications and monitor blood pressures at home. Call office if systolics are over 679 over diastolics over 90  Check fasting CMP.

## 2021-06-17 NOTE — PROGRESS NOTES
2021    Name: Lee Baez : 1950 Sex: female  Age: 79 y.o. Subjective:  Chief Complaint   Patient presents with    Hypertension        Hypertension  Pertinent negatives include no chest pain, headaches, palpitations or shortness of breath. Hyperlipidemia  Pertinent negatives include no chest pain, myalgias or shortness of breath. She had COVID-19 infection in mid May of this year. She completely regained her sense of taste and smell. I told her to get her COVID-19 vaccination in late 2021. She fractured her left wrist in 2021. She still wears a brace. She has had a DEXA scan within the last year. She has a history of hypertension, hyperlipidemia, knee arthritis, urinary frequency, and depression. Pressures at home are very good since adjusting her medications. She is on Cymbalta 60 mg a day and does well on this. She has no symptoms of anhedonia, hypersomnolence or suicidal or homicidal ideations. She had her knee surgery earlier this year and  had an InterStim implant with her urologist.  She no longer has urgency and urinary frequency. She cannot have any dental procedures for a few months  and she will need antibiotic prophylaxis for this. ..    She takes topiramate for prophylaxis against migraines since she has had no migraine headaches. She complains of constipation. She does not take MiraLAX and I recommended that she start MiraLAX. Colonoscopy done by Dr Keagan Burrell. in 2020 showed diverticuli and tortuous colon with spasms. Her sister has severe osteoporosis . In 2020 patient had a DEXA scan which showed bone density  lowest T score of -1.2 in her hip. She continues on vitamin D3 and calcium from food sources    She had her mammogram in 2020 and this showed no mammographic evidence of malignancy. Another mammogram for her before she leaves. She will be moving in  to [place] [id number] to be with her daughter. She is anxious about this but she is also excited about it. .    Review of Systems   Constitutional: Negative for appetite change, fatigue and unexpected weight change. HENT: Negative for congestion, ear pain, facial swelling, postnasal drip, rhinorrhea, sore throat, tinnitus and trouble swallowing. Eyes: Negative for photophobia, pain, discharge, itching and visual disturbance. Respiratory: Negative for cough, shortness of breath, wheezing and stridor. Cardiovascular: Negative for chest pain, palpitations and leg swelling. Gastrointestinal: Positive for constipation. Negative for abdominal pain, anal bleeding, blood in stool, diarrhea, nausea and vomiting. Endocrine: Negative for cold intolerance, heat intolerance, polydipsia, polyphagia and polyuria. Genitourinary: Negative for difficulty urinating, dysuria, flank pain, frequency, hematuria and urgency. Musculoskeletal: Negative for arthralgias, gait problem, joint swelling and myalgias. See hpi   Skin: Negative for color change, pallor and rash. No palpable lump or mass under her left breast.   Allergic/Immunologic: Negative for environmental allergies and food allergies. Neurological: Positive for numbness. Negative for dizziness, tremors, seizures, syncope, speech difficulty, weakness, light-headedness and headaches. See hpi   Hematological: Negative for adenopathy. Does not bruise/bleed easily. Psychiatric/Behavioral: Negative for agitation, behavioral problems, confusion, sleep disturbance and suicidal ideas. The patient is nervous/anxious.            Current Outpatient Medications:     vitamin D (ERGOCALCIFEROL) 1.25 MG (63452 UT) CAPS capsule, TAKE 1 CAPSULE EVERY TWO WEEKS, Disp: 6 capsule, Rfl: 3    hydroCHLOROthiazide (HYDRODIURIL) 25 MG tablet, TAKE 1 TABLET DAILY, Disp: 90 tablet, Rfl: 3    Handicap Placard MISC, by Does not apply route Duration 5 years, Disp: 1 each, Rfl: 0    amLODIPine (NORVASC) 5 MG Procedure Laterality Date    HYSTERECTOMY      JOINT REPLACEMENT  01/01/2007    Knee surgery    LUMBAR LAMINECTOMY Right 2019    SPINE SURGERY  01/01/2001    neck surgery with Dr Anthony Dwyer        Family History   Problem Relation Age of Onset    Diabetes Mother         Social History     Tobacco Use    Smoking status: Never Smoker    Smokeless tobacco: Never Used   Substance Use Topics    Alcohol use: No    Drug use: No        Objective  Vitals:    06/17/21 1023   BP: 122/64   Site: Right Upper Arm   Position: Sitting   Cuff Size: Medium Adult   Pulse: 81   Temp: 97.6 °F (36.4 °C)   TempSrc: Temporal   SpO2: 98%   Weight: 173 lb (78.5 kg)   Height: 5' 7\" (1.702 m)        Exam:  Physical Exam  Constitutional:       Appearance: She is well-developed. HENT:      Head: Normocephalic. Right Ear: External ear normal.      Left Ear: External ear normal.      Nose: Nose normal.      Mouth/Throat:      Pharynx: No oropharyngeal exudate. Eyes:      General: No scleral icterus. Right eye: No discharge. Left eye: No discharge. Conjunctiva/sclera: Conjunctivae normal.      Pupils: Pupils are equal, round, and reactive to light. Neck:      Thyroid: No thyromegaly. Cardiovascular:      Rate and Rhythm: Normal rate and regular rhythm. Heart sounds: Normal heart sounds. No murmur heard. No friction rub. No gallop. Pulmonary:      Effort: Pulmonary effort is normal. No respiratory distress. Breath sounds: Normal breath sounds. No wheezing or rales. Chest:      Chest wall: No tenderness. Abdominal:      General: Bowel sounds are normal. There is no distension. Palpations: Abdomen is soft. There is no mass. Tenderness: There is no abdominal tenderness. There is no guarding or rebound. Musculoskeletal:         General: No tenderness or deformity. Normal range of motion. Cervical back: Normal range of motion and neck supple.       Comments: Bunion right great toe She has an approximately 3 x 5 cm mass on her left anterior hip that feels like a lipoma. Tenderness to palpation right hip. Left wrist is in a brace   Lymphadenopathy:      Cervical: No cervical adenopathy. Skin:     General: Skin is warm and dry. Coloration: Skin is not pale. Findings: No erythema or rash. Neurological:      Mental Status: She is alert and oriented to person, place, and time. Cranial Nerves: No cranial nerve deficit. Sensory: No sensory deficit. Deep Tendon Reflexes: Reflexes normal.   Psychiatric:         Mood and Affect: Mood normal.         Behavior: Behavior normal.         Thought Content: Thought content normal.         Judgment: Judgment normal.          Last labs reviewed. ASSESSMENT & PLAN :   Problem List        Circulatory    Essential hypertension - Primary     Blood pressures are stable. Continue medications and monitor blood pressures at home. Call office if systolics are over 551 over diastolics over 90  Check fasting CMP. Relevant Medications    amLODIPine (NORVASC) 5 MG tablet    Other Relevant Orders    Comprehensive Metabolic Panel       Nervous and Auditory    Migraine without aura and without status migrainosus, not intractable       continue Topamax for prophylaxis against migraines. She has not had 1 recently.          Relevant Medications    COATED ASPIRIN PO    DULoxetine (CYMBALTA) 60 MG extended release capsule    topiramate (TOPAMAX) 50 MG tablet    amLODIPine (NORVASC) 5 MG tablet       Other    Depression       continue duloxetine as it is working well for         Relevant Medications    DULoxetine (CYMBALTA) 60 MG extended release capsule    Hyperlipidemia     Watch saturated fats in diet and will monitor lipids  continue atorvastatin 10 mg at bedtime         Relevant Medications    COATED ASPIRIN PO    atorvastatin (LIPITOR) 10 MG tablet    amLODIPine (NORVASC) 5 MG tablet    hydroCHLOROthiazide (HYDRODIURIL) 25 MG tablet Other Relevant Orders    Lipid Panel    Hyperglycemia       check hemoglobin A1c         Relevant Orders    Hemoglobin A1C           Will be moving to [place] in July 2021      Liam Peñaloza DO  6/17/2021

## 2021-09-05 DIAGNOSIS — E87.6 HYPOKALEMIA: ICD-10-CM

## 2021-09-07 RX ORDER — POTASSIUM CHLORIDE 1500 MG/1
TABLET, EXTENDED RELEASE ORAL
Qty: 90 TABLET | Refills: 3 | Status: SHIPPED | OUTPATIENT
Start: 2021-09-07

## 2021-10-18 RX ORDER — ATORVASTATIN CALCIUM 10 MG/1
TABLET, FILM COATED ORAL
Qty: 90 TABLET | Refills: 3 | Status: SHIPPED | OUTPATIENT
Start: 2021-10-18

## 2022-03-30 RX ORDER — ERGOCALCIFEROL 1.25 MG/1
CAPSULE ORAL
Qty: 6 CAPSULE | Refills: 3 | OUTPATIENT
Start: 2022-03-30

## 2022-03-31 DIAGNOSIS — I10 ESSENTIAL HYPERTENSION: ICD-10-CM

## 2022-04-01 RX ORDER — AMLODIPINE BESYLATE 5 MG/1
TABLET ORAL
Qty: 90 TABLET | Refills: 3 | OUTPATIENT
Start: 2022-04-01

## 2022-04-06 ENCOUNTER — CARE COORDINATION (OUTPATIENT)
Dept: CARE COORDINATION | Age: 72
End: 2022-04-06

## 2022-04-11 RX ORDER — HYDROCHLOROTHIAZIDE 25 MG/1
TABLET ORAL
Qty: 90 TABLET | Refills: 3 | OUTPATIENT
Start: 2022-04-11

## 2022-04-26 DIAGNOSIS — G43.009 MIGRAINE WITHOUT AURA AND WITHOUT STATUS MIGRAINOSUS, NOT INTRACTABLE: ICD-10-CM

## 2022-04-26 RX ORDER — TOPIRAMATE 50 MG/1
TABLET, FILM COATED ORAL
Qty: 180 TABLET | Refills: 3 | OUTPATIENT
Start: 2022-04-26